# Patient Record
Sex: FEMALE | Race: BLACK OR AFRICAN AMERICAN | NOT HISPANIC OR LATINO | Employment: OTHER | URBAN - METROPOLITAN AREA
[De-identification: names, ages, dates, MRNs, and addresses within clinical notes are randomized per-mention and may not be internally consistent; named-entity substitution may affect disease eponyms.]

---

## 2023-01-28 ENCOUNTER — HOSPITAL ENCOUNTER (INPATIENT)
Facility: HOSPITAL | Age: 88
LOS: 2 days | Discharge: HOME HEALTH | DRG: 066 | End: 2023-01-31
Attending: EMERGENCY MEDICINE | Admitting: INTERNAL MEDICINE
Payer: MEDICARE

## 2023-01-28 DIAGNOSIS — R55 NEAR SYNCOPE: ICD-10-CM

## 2023-01-28 DIAGNOSIS — I63.9 STROKE: ICD-10-CM

## 2023-01-28 DIAGNOSIS — T16.1XXA FOREIGN BODY OF RIGHT EAR, INITIAL ENCOUNTER: ICD-10-CM

## 2023-01-28 DIAGNOSIS — I16.0 HYPERTENSIVE URGENCY: ICD-10-CM

## 2023-01-28 DIAGNOSIS — R42 DIZZINESS: Primary | ICD-10-CM

## 2023-01-28 PROBLEM — I10 UNCONTROLLED HYPERTENSION: Status: ACTIVE | Noted: 2023-01-28

## 2023-01-28 PROBLEM — N18.30 CHRONIC KIDNEY DISEASE, STAGE 3: Status: ACTIVE | Noted: 2023-01-28

## 2023-01-28 LAB
ALBUMIN SERPL BCP-MCNC: 3.9 G/DL (ref 3.5–5.2)
ALP SERPL-CCNC: 78 U/L (ref 55–135)
ALT SERPL W/O P-5'-P-CCNC: 11 U/L (ref 10–44)
ANION GAP SERPL CALC-SCNC: 9 MMOL/L (ref 8–16)
AST SERPL-CCNC: 23 U/L (ref 10–40)
BASOPHILS # BLD AUTO: 0.02 K/UL (ref 0–0.2)
BASOPHILS NFR BLD: 0.5 % (ref 0–1.9)
BILIRUB SERPL-MCNC: 0.8 MG/DL (ref 0.1–1)
BUN SERPL-MCNC: 17 MG/DL (ref 8–23)
CALCIUM SERPL-MCNC: 9.5 MG/DL (ref 8.7–10.5)
CHLORIDE SERPL-SCNC: 102 MMOL/L (ref 95–110)
CO2 SERPL-SCNC: 24 MMOL/L (ref 23–29)
CREAT SERPL-MCNC: 1.1 MG/DL (ref 0.5–1.4)
DIFFERENTIAL METHOD: ABNORMAL
EOSINOPHIL # BLD AUTO: 0.2 K/UL (ref 0–0.5)
EOSINOPHIL NFR BLD: 4 % (ref 0–8)
ERYTHROCYTE [DISTWIDTH] IN BLOOD BY AUTOMATED COUNT: 15.9 % (ref 11.5–14.5)
EST. GFR  (NO RACE VARIABLE): 48.6 ML/MIN/1.73 M^2
GLUCOSE SERPL-MCNC: 88 MG/DL (ref 70–110)
HCT VFR BLD AUTO: 41.4 % (ref 37–48.5)
HGB BLD-MCNC: 13.2 G/DL (ref 12–16)
IMM GRANULOCYTES # BLD AUTO: 0.01 K/UL (ref 0–0.04)
IMM GRANULOCYTES NFR BLD AUTO: 0.2 % (ref 0–0.5)
LYMPHOCYTES # BLD AUTO: 1 K/UL (ref 1–4.8)
LYMPHOCYTES NFR BLD: 23.4 % (ref 18–48)
MAGNESIUM SERPL-MCNC: 1.9 MG/DL (ref 1.6–2.6)
MCH RBC QN AUTO: 27.4 PG (ref 27–31)
MCHC RBC AUTO-ENTMCNC: 31.9 G/DL (ref 32–36)
MCV RBC AUTO: 86 FL (ref 82–98)
MONOCYTES # BLD AUTO: 0.5 K/UL (ref 0.3–1)
MONOCYTES NFR BLD: 11.1 % (ref 4–15)
NEUTROPHILS # BLD AUTO: 2.6 K/UL (ref 1.8–7.7)
NEUTROPHILS NFR BLD: 60.8 % (ref 38–73)
NRBC BLD-RTO: 0 /100 WBC
PLATELET # BLD AUTO: 243 K/UL (ref 150–450)
PMV BLD AUTO: 10.8 FL (ref 9.2–12.9)
POTASSIUM SERPL-SCNC: 4.2 MMOL/L (ref 3.5–5.1)
PROT SERPL-MCNC: 7.4 G/DL (ref 6–8.4)
RBC # BLD AUTO: 4.82 M/UL (ref 4–5.4)
SODIUM SERPL-SCNC: 135 MMOL/L (ref 136–145)
TROPONIN I SERPL HS-MCNC: 8.4 PG/ML (ref 0–14.9)
WBC # BLD AUTO: 4.23 K/UL (ref 3.9–12.7)

## 2023-01-28 PROCEDURE — 96375 TX/PRO/DX INJ NEW DRUG ADDON: CPT

## 2023-01-28 PROCEDURE — 81001 URINALYSIS AUTO W/SCOPE: CPT | Performed by: NURSE PRACTITIONER

## 2023-01-28 PROCEDURE — 63600175 PHARM REV CODE 636 W HCPCS: Performed by: EMERGENCY MEDICINE

## 2023-01-28 PROCEDURE — G0378 HOSPITAL OBSERVATION PER HR: HCPCS

## 2023-01-28 PROCEDURE — 25000003 PHARM REV CODE 250: Performed by: EMERGENCY MEDICINE

## 2023-01-28 PROCEDURE — 25000003 PHARM REV CODE 250: Performed by: NURSE PRACTITIONER

## 2023-01-28 PROCEDURE — 85025 COMPLETE CBC W/AUTO DIFF WBC: CPT | Performed by: EMERGENCY MEDICINE

## 2023-01-28 PROCEDURE — 93010 EKG 12-LEAD: ICD-10-PCS | Mod: ,,, | Performed by: INTERNAL MEDICINE

## 2023-01-28 PROCEDURE — 69200 CLEAR OUTER EAR CANAL: CPT | Mod: RT

## 2023-01-28 PROCEDURE — 93005 ELECTROCARDIOGRAM TRACING: CPT | Performed by: INTERNAL MEDICINE

## 2023-01-28 PROCEDURE — 99285 EMERGENCY DEPT VISIT HI MDM: CPT | Mod: 25

## 2023-01-28 PROCEDURE — 84484 ASSAY OF TROPONIN QUANT: CPT | Performed by: EMERGENCY MEDICINE

## 2023-01-28 PROCEDURE — 96376 TX/PRO/DX INJ SAME DRUG ADON: CPT

## 2023-01-28 PROCEDURE — 80053 COMPREHEN METABOLIC PANEL: CPT | Performed by: EMERGENCY MEDICINE

## 2023-01-28 PROCEDURE — 83735 ASSAY OF MAGNESIUM: CPT | Performed by: EMERGENCY MEDICINE

## 2023-01-28 PROCEDURE — 93010 ELECTROCARDIOGRAM REPORT: CPT | Mod: ,,, | Performed by: INTERNAL MEDICINE

## 2023-01-28 PROCEDURE — 96374 THER/PROPH/DIAG INJ IV PUSH: CPT

## 2023-01-28 RX ORDER — MECLIZINE HCL 12.5 MG 12.5 MG/1
25 TABLET ORAL
Status: COMPLETED | OUTPATIENT
Start: 2023-01-28 | End: 2023-01-28

## 2023-01-28 RX ORDER — LABETALOL HYDROCHLORIDE 5 MG/ML
10 INJECTION, SOLUTION INTRAVENOUS
Status: DISCONTINUED | OUTPATIENT
Start: 2023-01-28 | End: 2023-01-29

## 2023-01-28 RX ORDER — LABETALOL HYDROCHLORIDE 5 MG/ML
10 INJECTION, SOLUTION INTRAVENOUS
Status: COMPLETED | OUTPATIENT
Start: 2023-01-28 | End: 2023-01-28

## 2023-01-28 RX ORDER — ASPIRIN 325 MG
325 TABLET ORAL ONCE
Status: COMPLETED | OUTPATIENT
Start: 2023-01-28 | End: 2023-01-28

## 2023-01-28 RX ORDER — NAPROXEN SODIUM 220 MG/1
81 TABLET, FILM COATED ORAL DAILY
Status: DISCONTINUED | OUTPATIENT
Start: 2023-01-29 | End: 2023-01-31 | Stop reason: HOSPADM

## 2023-01-28 RX ORDER — ONDANSETRON 2 MG/ML
4 INJECTION INTRAMUSCULAR; INTRAVENOUS
Status: COMPLETED | OUTPATIENT
Start: 2023-01-28 | End: 2023-01-28

## 2023-01-28 RX ADMIN — LABETALOL HYDROCHLORIDE 10 MG: 5 INJECTION INTRAVENOUS at 10:01

## 2023-01-28 RX ADMIN — ASPIRIN 325 MG ORAL TABLET 325 MG: 325 PILL ORAL at 10:01

## 2023-01-28 RX ADMIN — LABETALOL HYDROCHLORIDE 10 MG: 5 INJECTION INTRAVENOUS at 09:01

## 2023-01-28 RX ADMIN — MECLIZINE HYDROCHLORIDE 25 MG: 12.5 TABLET ORAL at 09:01

## 2023-01-28 RX ADMIN — ONDANSETRON 4 MG: 2 INJECTION INTRAMUSCULAR; INTRAVENOUS at 09:01

## 2023-01-28 NOTE — FIRST PROVIDER EVALUATION
Medical screening examination initiated.  I have conducted a focused provider triage encounter, findings are as follows:    Brief history of present illness:  Presents with complaint of dizziness.  Patient reports she is not on blood pressure medication.  Her blood pressure here is 200/125.  Patient reports headache and blurred vision.    There were no vitals filed for this visit.    Pertinent physical exam:  Patient is alert.  She is oriented.  Heart rate regular.  Brief workup plan:  Cardiac workup    Preliminary workup initiated; this workup will be continued and followed by the physician or advanced practice provider that is assigned to the patient when roomed.

## 2023-01-29 ENCOUNTER — CLINICAL SUPPORT (OUTPATIENT)
Dept: CARDIOLOGY | Facility: HOSPITAL | Age: 88
DRG: 066 | End: 2023-01-29
Attending: EMERGENCY MEDICINE
Payer: MEDICARE

## 2023-01-29 VITALS — BODY MASS INDEX: 30.73 KG/M2 | HEIGHT: 62 IN | WEIGHT: 167 LBS

## 2023-01-29 PROBLEM — I63.9 CVA (CEREBRAL VASCULAR ACCIDENT): Status: ACTIVE | Noted: 2023-01-29

## 2023-01-29 LAB
ALBUMIN SERPL BCP-MCNC: 3.7 G/DL (ref 3.5–5.2)
ALP SERPL-CCNC: 71 U/L (ref 55–135)
ALT SERPL W/O P-5'-P-CCNC: 11 U/L (ref 10–44)
ANION GAP SERPL CALC-SCNC: 8 MMOL/L (ref 8–16)
AORTIC ROOT ANNULUS: 2.87 CM
AORTIC VALVE CUSP SEPERATION: 1.87 CM
APTT BLDCRRT: 24.4 SEC (ref 21–32)
AST SERPL-CCNC: 20 U/L (ref 10–40)
AV INDEX (PROSTH): 0.78
AV MEAN GRADIENT: 5 MMHG
AV PEAK GRADIENT: 8 MMHG
AV REGURGITATION PRESSURE HALF TIME: 391.66 MS
AV VALVE AREA: 2.46 CM2
AV VELOCITY RATIO: 0.81
BACTERIA #/AREA URNS HPF: NEGATIVE /HPF
BASOPHILS # BLD AUTO: 0.02 K/UL (ref 0–0.2)
BASOPHILS NFR BLD: 0.6 % (ref 0–1.9)
BILIRUB SERPL-MCNC: 0.8 MG/DL (ref 0.1–1)
BILIRUB UR QL STRIP: NEGATIVE
BSA FOR ECHO PROCEDURE: 1.82 M2
BUN SERPL-MCNC: 14 MG/DL (ref 8–23)
CALCIUM SERPL-MCNC: 9.3 MG/DL (ref 8.7–10.5)
CHLORIDE SERPL-SCNC: 102 MMOL/L (ref 95–110)
CHOLEST SERPL-MCNC: 154 MG/DL (ref 120–199)
CHOLEST/HDLC SERPL: 3.3 {RATIO} (ref 2–5)
CK MB SERPL-MCNC: 2.9 NG/ML (ref 0.1–6.5)
CLARITY UR: CLEAR
CO2 SERPL-SCNC: 25 MMOL/L (ref 23–29)
COLOR UR: YELLOW
CREAT SERPL-MCNC: 1 MG/DL (ref 0.5–1.4)
CV ECHO LV RWT: 0.83 CM
DIFFERENTIAL METHOD: ABNORMAL
DOP CALC AO PEAK VEL: 1.37 M/S
DOP CALC AO VTI: 29.1 CM
DOP CALC LVOT AREA: 3.1 CM2
DOP CALC LVOT DIAMETER: 2 CM
DOP CALC LVOT PEAK VEL: 1.11 M/S
DOP CALC LVOT STROKE VOLUME: 71.59 CM3
DOP CALCLVOT PEAK VEL VTI: 22.8 CM
E WAVE DECELERATION TIME: 329.49 MSEC
E/A RATIO: 0.77
E/E' RATIO: 15.2 M/S
ECHO LV POSTERIOR WALL: 1.22 CM (ref 0.6–1.1)
EJECTION FRACTION: 78 %
EOSINOPHIL # BLD AUTO: 0.1 K/UL (ref 0–0.5)
EOSINOPHIL NFR BLD: 4.1 % (ref 0–8)
ERYTHROCYTE [DISTWIDTH] IN BLOOD BY AUTOMATED COUNT: 15.7 % (ref 11.5–14.5)
EST. GFR  (NO RACE VARIABLE): 54.5 ML/MIN/1.73 M^2
ESTIMATED AVG GLUCOSE: 126 MG/DL (ref 68–131)
FRACTIONAL SHORTENING: 44 % (ref 28–44)
GLUCOSE SERPL-MCNC: 93 MG/DL (ref 70–110)
GLUCOSE UR QL STRIP: NEGATIVE
HBA1C MFR BLD: 6 % (ref 4.5–6.2)
HCT VFR BLD AUTO: 41.3 % (ref 37–48.5)
HDLC SERPL-MCNC: 47 MG/DL (ref 40–75)
HDLC SERPL: 30.5 % (ref 20–50)
HGB BLD-MCNC: 13.2 G/DL (ref 12–16)
HGB UR QL STRIP: NEGATIVE
HYALINE CASTS #/AREA URNS LPF: 3 /LPF
IMM GRANULOCYTES # BLD AUTO: 0.01 K/UL (ref 0–0.04)
IMM GRANULOCYTES NFR BLD AUTO: 0.3 % (ref 0–0.5)
INR PPP: 1 (ref 0.8–1.2)
INTERVENTRICULAR SEPTUM: 2.05 CM (ref 0.6–1.1)
KETONES UR QL STRIP: NEGATIVE
LDLC SERPL CALC-MCNC: 100 MG/DL (ref 63–159)
LEFT INTERNAL DIMENSION IN SYSTOLE: 1.65 CM (ref 2.1–4)
LEFT VENTRICLE DIASTOLIC VOLUME INDEX: 18.71 ML/M2
LEFT VENTRICLE DIASTOLIC VOLUME: 33.12 ML
LEFT VENTRICLE MASS INDEX: 101 G/M2
LEFT VENTRICLE SYSTOLIC VOLUME INDEX: 4.4 ML/M2
LEFT VENTRICLE SYSTOLIC VOLUME: 7.7 ML
LEFT VENTRICULAR INTERNAL DIMENSION IN DIASTOLE: 2.93 CM (ref 3.5–6)
LEFT VENTRICULAR MASS: 177.96 G
LEUKOCYTE ESTERASE UR QL STRIP: ABNORMAL
LV LATERAL E/E' RATIO: 12.67 M/S
LV SEPTAL E/E' RATIO: 19 M/S
LVOT MG: 2.57 MMHG
LVOT MV: 0.74 CM/S
LYMPHOCYTES # BLD AUTO: 1.3 K/UL (ref 1–4.8)
LYMPHOCYTES NFR BLD: 36.9 % (ref 18–48)
MAGNESIUM SERPL-MCNC: 1.9 MG/DL (ref 1.6–2.6)
MCH RBC QN AUTO: 27 PG (ref 27–31)
MCHC RBC AUTO-ENTMCNC: 32 G/DL (ref 32–36)
MCV RBC AUTO: 85 FL (ref 82–98)
MICROSCOPIC COMMENT: ABNORMAL
MONOCYTES # BLD AUTO: 0.5 K/UL (ref 0.3–1)
MONOCYTES NFR BLD: 14.2 % (ref 4–15)
MV PEAK A VEL: 0.99 M/S
MV PEAK E VEL: 0.76 M/S
MV STENOSIS PRESSURE HALF TIME: 95.55 MS
MV VALVE AREA P 1/2 METHOD: 2.3 CM2
NEUTROPHILS # BLD AUTO: 1.5 K/UL (ref 1.8–7.7)
NEUTROPHILS NFR BLD: 43.9 % (ref 38–73)
NITRITE UR QL STRIP: NEGATIVE
NONHDLC SERPL-MCNC: 107 MG/DL
NRBC BLD-RTO: 0 /100 WBC
PH UR STRIP: 6 [PH] (ref 5–8)
PHOSPHATE SERPL-MCNC: 2.5 MG/DL (ref 2.7–4.5)
PISA TR MAX VEL: 2.95 M/S
PLATELET # BLD AUTO: 227 K/UL (ref 150–450)
PMV BLD AUTO: 9.9 FL (ref 9.2–12.9)
POTASSIUM SERPL-SCNC: 3.5 MMOL/L (ref 3.5–5.1)
PROT SERPL-MCNC: 7 G/DL (ref 6–8.4)
PROT UR QL STRIP: NEGATIVE
PROTHROMBIN TIME: 10.5 SEC (ref 9–12.5)
PV MV: 0.79 M/S
PV PEAK VELOCITY: 1.15 CM/S
RA PRESSURE: 3 MMHG
RBC # BLD AUTO: 4.88 M/UL (ref 4–5.4)
RBC #/AREA URNS HPF: 3 /HPF (ref 0–4)
RV TISSUE DOPPLER FREE WALL SYSTOLIC VELOCITY 1 (APICAL 4 CHAMBER VIEW): 0.01 CM/S
SINUS: 2.49 CM
SODIUM SERPL-SCNC: 135 MMOL/L (ref 136–145)
SP GR UR STRIP: 1.01 (ref 1–1.03)
SQUAMOUS #/AREA URNS HPF: 3 /HPF
STJ: 2.88 CM
TDI LATERAL: 0.06 M/S
TDI SEPTAL: 0.04 M/S
TDI: 0.05 M/S
TR MAX PG: 35 MMHG
TRICUSPID ANNULAR PLANE SYSTOLIC EXCURSION: 1.67 CM
TRIGL SERPL-MCNC: 35 MG/DL (ref 30–150)
TROPONIN I SERPL HS-MCNC: 10.9 PG/ML (ref 0–14.9)
TSH SERPL DL<=0.005 MIU/L-ACNC: 4.07 UIU/ML (ref 0.34–5.6)
TV REST PULMONARY ARTERY PRESSURE: 38 MMHG
URN SPEC COLLECT METH UR: ABNORMAL
UROBILINOGEN UR STRIP-ACNC: NEGATIVE EU/DL
WBC # BLD AUTO: 3.44 K/UL (ref 3.9–12.7)
WBC #/AREA URNS HPF: 9 /HPF (ref 0–5)

## 2023-01-29 PROCEDURE — 25500020 PHARM REV CODE 255: Performed by: INTERNAL MEDICINE

## 2023-01-29 PROCEDURE — 97161 PT EVAL LOW COMPLEX 20 MIN: CPT

## 2023-01-29 PROCEDURE — 85610 PROTHROMBIN TIME: CPT | Performed by: NURSE PRACTITIONER

## 2023-01-29 PROCEDURE — 97535 SELF CARE MNGMENT TRAINING: CPT

## 2023-01-29 PROCEDURE — 92610 EVALUATE SWALLOWING FUNCTION: CPT

## 2023-01-29 PROCEDURE — 97165 OT EVAL LOW COMPLEX 30 MIN: CPT

## 2023-01-29 PROCEDURE — 83735 ASSAY OF MAGNESIUM: CPT | Performed by: NURSE PRACTITIONER

## 2023-01-29 PROCEDURE — 92523 SPEECH SOUND LANG COMPREHEN: CPT

## 2023-01-29 PROCEDURE — 84484 ASSAY OF TROPONIN QUANT: CPT | Performed by: NURSE PRACTITIONER

## 2023-01-29 PROCEDURE — 93306 ECHO (CUPID ONLY): ICD-10-PCS | Mod: 26,,, | Performed by: INTERNAL MEDICINE

## 2023-01-29 PROCEDURE — 93306 TTE W/DOPPLER COMPLETE: CPT

## 2023-01-29 PROCEDURE — 25000003 PHARM REV CODE 250: Performed by: STUDENT IN AN ORGANIZED HEALTH CARE EDUCATION/TRAINING PROGRAM

## 2023-01-29 PROCEDURE — 85025 COMPLETE CBC W/AUTO DIFF WBC: CPT | Performed by: NURSE PRACTITIONER

## 2023-01-29 PROCEDURE — 25000003 PHARM REV CODE 250: Performed by: INTERNAL MEDICINE

## 2023-01-29 PROCEDURE — 25000003 PHARM REV CODE 250: Performed by: NURSE PRACTITIONER

## 2023-01-29 PROCEDURE — 80053 COMPREHEN METABOLIC PANEL: CPT | Performed by: NURSE PRACTITIONER

## 2023-01-29 PROCEDURE — 63600175 PHARM REV CODE 636 W HCPCS

## 2023-01-29 PROCEDURE — 36415 COLL VENOUS BLD VENIPUNCTURE: CPT | Performed by: NURSE PRACTITIONER

## 2023-01-29 PROCEDURE — 96375 TX/PRO/DX INJ NEW DRUG ADDON: CPT

## 2023-01-29 PROCEDURE — 63600175 PHARM REV CODE 636 W HCPCS: Performed by: STUDENT IN AN ORGANIZED HEALTH CARE EDUCATION/TRAINING PROGRAM

## 2023-01-29 PROCEDURE — 63600175 PHARM REV CODE 636 W HCPCS: Performed by: NURSE PRACTITIONER

## 2023-01-29 PROCEDURE — 82553 CREATINE MB FRACTION: CPT | Performed by: NURSE PRACTITIONER

## 2023-01-29 PROCEDURE — 84443 ASSAY THYROID STIM HORMONE: CPT | Performed by: NURSE PRACTITIONER

## 2023-01-29 PROCEDURE — 21400001 HC TELEMETRY ROOM

## 2023-01-29 PROCEDURE — 93306 TTE W/DOPPLER COMPLETE: CPT | Mod: 26,,, | Performed by: INTERNAL MEDICINE

## 2023-01-29 PROCEDURE — 83036 HEMOGLOBIN GLYCOSYLATED A1C: CPT | Performed by: NURSE PRACTITIONER

## 2023-01-29 PROCEDURE — 96376 TX/PRO/DX INJ SAME DRUG ADON: CPT

## 2023-01-29 PROCEDURE — 85730 THROMBOPLASTIN TIME PARTIAL: CPT | Performed by: NURSE PRACTITIONER

## 2023-01-29 PROCEDURE — 80061 LIPID PANEL: CPT | Performed by: NURSE PRACTITIONER

## 2023-01-29 PROCEDURE — 84100 ASSAY OF PHOSPHORUS: CPT | Performed by: NURSE PRACTITIONER

## 2023-01-29 RX ORDER — CLOPIDOGREL BISULFATE 75 MG/1
75 TABLET ORAL DAILY
Status: DISCONTINUED | OUTPATIENT
Start: 2023-01-29 | End: 2023-01-31 | Stop reason: HOSPADM

## 2023-01-29 RX ORDER — LABETALOL HYDROCHLORIDE 5 MG/ML
10 INJECTION, SOLUTION INTRAVENOUS EVERY 4 HOURS PRN
Status: DISCONTINUED | OUTPATIENT
Start: 2023-01-29 | End: 2023-01-31 | Stop reason: HOSPADM

## 2023-01-29 RX ORDER — AMLODIPINE BESYLATE 5 MG/1
5 TABLET ORAL ONCE
Status: COMPLETED | OUTPATIENT
Start: 2023-01-29 | End: 2023-01-29

## 2023-01-29 RX ORDER — LANOLIN ALCOHOL/MO/W.PET/CERES
800 CREAM (GRAM) TOPICAL
Status: DISCONTINUED | OUTPATIENT
Start: 2023-01-29 | End: 2023-01-31 | Stop reason: HOSPADM

## 2023-01-29 RX ORDER — SODIUM,POTASSIUM PHOSPHATES 280-250MG
2 POWDER IN PACKET (EA) ORAL
Status: DISCONTINUED | OUTPATIENT
Start: 2023-01-29 | End: 2023-01-31 | Stop reason: HOSPADM

## 2023-01-29 RX ORDER — SODIUM CHLORIDE 0.9 % (FLUSH) 0.9 %
10 SYRINGE (ML) INJECTION
Status: DISCONTINUED | OUTPATIENT
Start: 2023-01-29 | End: 2023-01-31 | Stop reason: HOSPADM

## 2023-01-29 RX ORDER — AMLODIPINE BESYLATE 5 MG/1
5 TABLET ORAL DAILY
Status: DISCONTINUED | OUTPATIENT
Start: 2023-01-29 | End: 2023-01-29

## 2023-01-29 RX ORDER — ONDANSETRON 2 MG/ML
4 INJECTION INTRAMUSCULAR; INTRAVENOUS EVERY 6 HOURS PRN
Status: DISCONTINUED | OUTPATIENT
Start: 2023-01-29 | End: 2023-01-31 | Stop reason: HOSPADM

## 2023-01-29 RX ORDER — AMLODIPINE BESYLATE 5 MG/1
10 TABLET ORAL DAILY
Status: DISCONTINUED | OUTPATIENT
Start: 2023-01-30 | End: 2023-01-31 | Stop reason: HOSPADM

## 2023-01-29 RX ORDER — POLYETHYLENE GLYCOL 3350 17 G/17G
17 POWDER, FOR SOLUTION ORAL 2 TIMES DAILY PRN
Status: DISCONTINUED | OUTPATIENT
Start: 2023-01-29 | End: 2023-01-31 | Stop reason: HOSPADM

## 2023-01-29 RX ORDER — HYDRALAZINE HYDROCHLORIDE 20 MG/ML
INJECTION INTRAMUSCULAR; INTRAVENOUS
Status: COMPLETED
Start: 2023-01-29 | End: 2023-01-29

## 2023-01-29 RX ORDER — ATORVASTATIN CALCIUM 40 MG/1
40 TABLET, FILM COATED ORAL DAILY
Status: DISCONTINUED | OUTPATIENT
Start: 2023-01-29 | End: 2023-01-31 | Stop reason: HOSPADM

## 2023-01-29 RX ORDER — ACETAMINOPHEN 325 MG/1
650 TABLET ORAL EVERY 6 HOURS PRN
Status: DISCONTINUED | OUTPATIENT
Start: 2023-01-29 | End: 2023-01-31 | Stop reason: HOSPADM

## 2023-01-29 RX ORDER — HYDRALAZINE HYDROCHLORIDE 20 MG/ML
10 INJECTION INTRAMUSCULAR; INTRAVENOUS EVERY 4 HOURS PRN
Status: DISCONTINUED | OUTPATIENT
Start: 2023-01-29 | End: 2023-01-31 | Stop reason: HOSPADM

## 2023-01-29 RX ORDER — ENOXAPARIN SODIUM 100 MG/ML
40 INJECTION SUBCUTANEOUS EVERY 24 HOURS
Status: DISCONTINUED | OUTPATIENT
Start: 2023-01-29 | End: 2023-01-31 | Stop reason: HOSPADM

## 2023-01-29 RX ADMIN — ATORVASTATIN CALCIUM 40 MG: 40 TABLET, FILM COATED ORAL at 08:01

## 2023-01-29 RX ADMIN — HYDRALAZINE HYDROCHLORIDE 10 MG: 20 INJECTION INTRAMUSCULAR; INTRAVENOUS at 01:01

## 2023-01-29 RX ADMIN — AMLODIPINE BESYLATE 5 MG: 5 TABLET ORAL at 06:01

## 2023-01-29 RX ADMIN — IOHEXOL 100 ML: 350 INJECTION, SOLUTION INTRAVENOUS at 12:01

## 2023-01-29 RX ADMIN — CLOPIDOGREL BISULFATE 75 MG: 75 TABLET, FILM COATED ORAL at 01:01

## 2023-01-29 RX ADMIN — ENOXAPARIN SODIUM 40 MG: 100 INJECTION SUBCUTANEOUS at 06:01

## 2023-01-29 RX ADMIN — HYDRALAZINE HYDROCHLORIDE 10 MG: 20 INJECTION INTRAMUSCULAR; INTRAVENOUS at 05:01

## 2023-01-29 RX ADMIN — ASPIRIN 81 MG CHEWABLE TABLET 81 MG: 81 TABLET CHEWABLE at 08:01

## 2023-01-29 RX ADMIN — ATORVASTATIN CALCIUM 40 MG: 40 TABLET, FILM COATED ORAL at 04:01

## 2023-01-29 RX ADMIN — LABETALOL HYDROCHLORIDE 10 MG: 5 INJECTION INTRAVENOUS at 12:01

## 2023-01-29 RX ADMIN — AMLODIPINE BESYLATE 5 MG: 5 TABLET ORAL at 08:01

## 2023-01-29 NOTE — PLAN OF CARE
[facility]  Initial Discharge Assessment       Primary Care Provider: Primary Doctor No    Admission Diagnosis: Near syncope [R55]    Admission Date: 1/28/2023  Expected Discharge Date:     Discharge Barriers Identified: None    Payor: [employer] / Plan: [employer] MEDICARE HMO / Product Type: Medicare Advantage /     Extended Emergency Contact Information  Primary Emergency Contact: Sincere Fine  Mobile Phone: [phone]  Relation: Son  Preferred language: English   needed? No    Discharge Plan A: Home  Discharge Plan B: Home    No Pharmacies Listed    Initial Assessment (most recent)       Adult Discharge Assessment - 01/29/23 1543          Discharge Assessment    Assessment Type Discharge Planning Assessment     Confirmed/corrected address, phone number and insurance Yes     Confirmed Demographics Correct on Facesheet     Source of Information patient;health record     Reason For Admission Dizziness and giddiness     People in Home alone   Visiting son    Facility Arrived From: home     Do you expect to return to your current living situation? Yes     Do you have help at home or someone to help you manage your care at home? Yes     Who are your caregiver(s) and their phone number(s)? BabatundeSincere (Son)   [phone] (Mobile     Prior to hospitilization cognitive status: Alert/Oriented     Current cognitive status: Alert/Oriented     Equipment Currently Used at Home none     Readmission within 30 days? No     Patient currently being followed by outpatient case management? No     Do you currently have service(s) that help you manage your care at home? No     Do you take prescription medications? Yes     Do you have prescription coverage? Yes     Coverage [employer]     Do you have any problems affording any of your prescribed medications? No     Is the patient taking medications as prescribed? yes     Who is going to help you get home at discharge? Sincere Fine (Son)   [phone] (Mobile      How do you get to doctors appointments? car, drives self;family or friend will provide     Are you on dialysis? No     Do you take coumadin? No     Discharge Plan A Home     Discharge Plan B Home     DME Needed Upon Discharge  none     Discharge Plan discussed with: Patient     Discharge Barriers Identified None

## 2023-01-29 NOTE — SUBJECTIVE & OBJECTIVE
Past Medical History:   Diagnosis Date    Allergies     Essential (primary) hypertension        Past Surgical History:   Procedure Laterality Date    FOOT SURGERY         Review of patient's allergies indicates:  No Known Allergies    No current facility-administered medications on file prior to encounter.     No current outpatient medications on file prior to encounter.     Family History       Problem Relation (Age of Onset)    No Known Problems Mother    Stroke Maternal Aunt          Tobacco Use    Smoking status: Never    Smokeless tobacco: Never   Substance and Sexual Activity    Alcohol use: Never    Drug use: Never    Sexual activity: Not on file     Review of Systems   All other systems reviewed and are negative.  Objective:     Vital Signs (Most Recent):  Temp: 98.6 °F (37 °C) (01/28/23 1700)  Pulse: 72 (01/28/23 2221)  Resp: (!) 22 (01/28/23 2221)  BP: (!) 168/94 (01/28/23 2221)  SpO2: 100 % (01/28/23 2221)   Vital Signs (24h Range):  Temp:  [98.6 °F (37 °C)] 98.6 °F (37 °C)  Pulse:  [66-91] 72  Resp:  [10-22] 22  SpO2:  [94 %-100 %] 100 %  BP: (168-227)/() 168/94     Weight: 56.7 kg (125 lb)  Body mass index is 22.86 kg/m².    Physical Exam  Vitals and nursing note reviewed.   Constitutional:       General: She is not in acute distress.     Appearance: Normal appearance. She is not ill-appearing.      Comments: Elderly female, lying in bed awake alert, she is in no acute distress in his a fairly good historian.  She is accompanied by her son.   HENT:      Head: Normocephalic.      Right Ear: External ear normal.      Left Ear: External ear normal.      Ears:      Comments: A small piece of cotton was removed from her right ear by ED physician.     Nose: Nose normal.      Mouth/Throat:      Mouth: Mucous membranes are moist.      Pharynx: Oropharynx is clear.   Eyes:      Conjunctiva/sclera: Conjunctivae normal.   Cardiovascular:      Rate and Rhythm: Normal rate and regular rhythm.      Pulses:  Normal pulses.      Heart sounds: Normal heart sounds. No murmur heard.  Pulmonary:      Effort: Pulmonary effort is normal.      Breath sounds: Normal breath sounds. No wheezing or rhonchi.   Abdominal:      General: Bowel sounds are normal.      Palpations: Abdomen is soft.      Tenderness: There is no abdominal tenderness.   Musculoskeletal:         General: Normal range of motion.      Cervical back: Normal range of motion.      Comments: Moves all extremities with good equal strength, no drift.   Skin:     General: Skin is warm and dry.      Capillary Refill: Capillary refill takes less than 2 seconds.   Neurological:      General: No focal deficit present.      Mental Status: She is alert and oriented to person, place, and time.      Sensory: No sensory deficit.      Motor: No weakness.      Comments: Mildly impaired coordination   Psychiatric:         Mood and Affect: Mood normal.         Behavior: Behavior normal.         Thought Content: Thought content normal.         Judgment: Judgment normal.           Significant Labs: All pertinent labs within the past 24 hours have been reviewed.    BMP:   Recent Labs   Lab 01/28/23 2027   GLU 88   *   K 4.2      CO2 24   BUN 17   CREATININE 1.1   CALCIUM 9.5   MG 1.9     CBC:   Recent Labs   Lab 01/28/23 2027   WBC 4.23   HGB 13.2   HCT 41.4        CMP:   Recent Labs   Lab 01/28/23 2027   *   K 4.2      CO2 24   GLU 88   BUN 17   CREATININE 1.1   CALCIUM 9.5   PROT 7.4   ALBUMIN 3.9   BILITOT 0.8   ALKPHOS 78   AST 23   ALT 11   ANIONGAP 9     Magnesium:   Recent Labs   Lab 01/28/23 2027   MG 1.9       Troponin:   Recent Labs   Lab 01/28/23 2027   TROPONINIHS 8.4         Significant Imaging: I have reviewed all pertinent imaging results/findings within the past 24 hours.

## 2023-01-29 NOTE — PT/OT/SLP EVAL
Occupational Therapy   Evaluation, tx    Name: Heidi Fine  MRN: [id number]  Admitting Diagnosis: Dizziness and giddiness  Recent Surgery: * No surgery found *    The primary encounter diagnosis was Dizziness. Diagnoses of Hypertensive urgency, Foreign body of right ear, initial encounter, Near syncope, and Stroke were also pertinent to this visit.    Recommendations:     Discharge Recommendations: home health OT  Discharge Equipment Recommendations:  bath bench  Barriers to discharge:  None    Assessment:     Heidi Fine is a 87 y.o. female with a medical diagnosis of Dizziness and giddiness. 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 Pt. presents with Performance deficits affecting function: impaired functional mobility, gait instability, impaired balance, impaired self care skills, decreased lower extremity function, decreased safety awareness, decreased coordination.    Pt lacks insight into deficits and lacks good safety awareness. She declines and denies need for assistive device for ambulation and bathing. Pt is a fall risk, ambulated CGA with significant instability vs ataxia, trunk flexed forward near 90 degrees when ambulating; she and son reports this is her baseline. Pt is overall SBA with ADLS and CGA for transfers and ambulation.  OT recommending HHOT and a TTB for safety.  Continue with OT POC.     Rehab Prognosis: Good and Fair; patient would benefit from acute skilled OT services to address these deficits and reach maximum level of function.       Plan:     Patient to be seen 5 x/week to address the above listed problems via self-care/home management, therapeutic activities, therapeutic exercises  Plan of Care Expires: 02/26/23  Plan of Care Reviewed with: patient, son    Subjective     Chief Complaint: none  Patient/Family Comments/goals: I don't need anything to walk with, I am walking fine.    Occupational  Profile:  Living Environment: pt lives with son in [facility] with no steps; t/s combo.  Previous level of function: Indep ADLS, ambulated Indep without a device; does not drive; In dep meal prep and household chores.   Roles and Routines: active in above  Equipment Used at Home: none  Assistance upon Discharge: son, he works during the day in [place]    Pain/Comfort:  Pain Rating 1: 0/10  Pain Rating Post-Intervention 1: 0/10    Patients cultural, spiritual, Presybeterian conflicts given the current situation: no    Objective:     Communicated with: nurse prior to session.  Patient found HOB elevated with bed alarm, telemetry upon OT entry to room.    General Precautions: Standard, fall  Orthopedic Precautions: N/A  Braces: N/A  Respiratory Status: Room air    Occupational Performance:    Bed Mobility:    Patient completed Rolling/Turning to Left with  supervision  Patient completed Scooting/Bridging with stand by assistance  Patient completed Supine to Sit with stand by assistance  Patient completed Sit to Supine with stand by assistance    Functional Mobility/Transfers:  Patient completed Sit <> Stand Transfer with stand by assistance  with  no assistive device   Patient completed Toilet Transfer Step Transfer technique with contact guard assistance with  no AD  Functional Mobility: ambulated CGA short distance in room to bathroom, sink and back to bed, pt opened bathroom door without LOB. Pt's trunk forwardly flexed near 90 degrees, pt able to stand more erect with vc and tc, says she walks like this at home, significant instability with gait vs ataxia??     Activities of Daily Living:  Feeding:  independence .  Grooming: supervision/Stand by assistance standing at sink, questionable safety/judgement, bending to pick trash up from floor  Lower Body Dressing: stand by assistance socks, shoes seated EOB  Toileting: declined use    Cognitive/Visual Perceptual:  Cognitive/Psychosocial Skills:     -       Oriented to:  Person, Place, Time, and Situation   -       Follows Commands/attention:Follows one-step commands  -       Communication: clear/fluent  -       Memory: Poor immediate recall  -       Safety awareness/insight to disability: impaired   -       Mood/Affect/Coping skills/emotional control: Cooperative  Visual/Perceptual:      -Intact grossly; ?? Nystagmus left beating    Physical Exam:  Balance:    -       siting; good  dynamic: good  standing; good-  dynamic: fair  Postural examination/scapula alignment:    -       Rounded shoulders  -       Forward head  Sensation:    -       Intact  Motor Planning:    -       intact  Dominant hand:    -       rigth  Upper Extremity Range of Motion:     -       Right Upper Extremity: WFL  -       Left Upper Extremity: WFL  Upper Extremity Strength:    -       Right Upper Extremity: WFL  -       Left Upper Extremity: WFL   Strength:    -       Right Upper Extremity: WFL  -       Left Upper Extremity: WFL  Fine Motor Coordination:    -       Intact  Gross motor coordination:     Neurological:    -       normal tone    AMPAC 6 Click ADL:  AMPAC Total Score: 24    Treatment & Education:  Purpose of OT and POC'  DC planning with pt and son: HHOT and TTB recommended, pt not receptive to DME recs. Son not engaged, not talking.  All questions/concerns addressed within scope.  Pt instructed in RW use for safe ambulation in room for ADLS, she took 4 steps with walker with SBA/Supervision and pushed it aside statiing she did not need it.   Fall prevention/safety education provided, call don;t fall and call bell issued.       Patient left HOB elevated with all lines intact, call button in reach, bed alarm on, and nurse notified    GOALS:   Multidisciplinary Problems       Occupational Therapy Goals          Problem: Occupational Therapy    Goal Priority Disciplines Outcome Interventions   Occupational Therapy Goal     OT, PT/OT Ongoing, Progressing    Description: Goals to be met by:  2/15/2023     Patient will increase functional independence with ADLs by performing:    LE Dressing with Modified [place].  Grooming while standing at sink with Modified [place].  Toileting from toilet with Modified [place] for hygiene and clothing management.   Toilet transfer to toilet with Modified [place].                         History:     Past Medical History:   Diagnosis Date    Allergies     Essential (primary) hypertension          Past Surgical History:   Procedure Laterality Date    FOOT SURGERY         Time Tracking:     OT Date of Treatment: 01/29/23  OT Start Time: 1429  OT Stop Time: 1448  OT Total Time (min): 19 min    Billable Minutes:Evaluation 10  Self Care/Home Management 9  Total Time 19    1/29/2023

## 2023-01-29 NOTE — PT/OT/SLP EVAL
Physical Therapy Evaluation    Patient Name:  Heidi Fine   MRN:  [id number]    Recommendations:     Discharge Recommendations: home with home health, home health PT, home health OT   Discharge Equipment Recommendations: none   Barriers to discharge: None    Assessment:     Heidi Fine is a 87 y.o. female admitted with a medical diagnosis of Dizziness and giddiness.  She presents with the following impairments/functional limitations: gait instability, impaired balance.    Rehab Prognosis: Good; patient would benefit from acute skilled PT services to address these deficits and reach maximum level of function.    Recent Surgery: * No surgery found *      Plan:     During this hospitalization, patient to be seen 5 x/week to address the identified rehab impairments via gait training, therapeutic activities, therapeutic exercises, neuromuscular re-education and progress toward the following goals:    Plan of Care Expires:  03/05/23    Subjective     Chief Complaint: Pt has no complaints at time of assessment  Patient/Family Comments/goals: Go Home. Pt reports dizziness has resolved.  Pain/Comfort:  Pain Rating 1: 0/10    Patients cultural, spiritual, Christian conflicts given the current situation: no    Living Environment:  Pt lives /c son in a [facility]  Prior to admission, patients level of function was (I).  Equipment used at home: none.  DME owned (not currently used): none.  Upon discharge, patient will have assistance from Son.    Objective:     Communicated with nurse prior to session.  Patient found sitting edge of bed with peripheral IV  upon PT entry to room.    General Precautions: Standard, fall  Orthopedic Precautions:N/A   Braces: N/A  Respiratory Status: Room air    Exams:  Cognitive Exam:  Patient is oriented to Person, Place, Time, and Situation (difficulty following simple commands at times)  Gross Motor Coordination:  WFL  Postural Exam:  Patient presented with the following abnormalities:    -        Forward head  -       Posterior pelvic tilt  -       Kyphosis  Sensation:    -       Intact  RLE ROM: WFL  RLE Strength: WFL except hips 3+/5  LLE ROM: WFL  LLE Strength: WFL except hips 3+/5    Functional Mobility:  Bed Mobility:     Supine to Sit: supervision  Sit to Supine: supervision  Transfers:     Sit to Stand:  stand by assistance with no AD  Bed to Chair: stand by assistance with  no AD  using  Stand Pivot  Gait: 100ft contact guard assistance with no AD  Balance: standing unsupported dynamic: Fair-      AM-PAC 6 CLICK MOBILITY  Total Score:20       Treatment & Education:  Pt experienced 1 LOB to (L) /c gait assessment and difficulty turning/changing directions. She displays severely FF posture at hips and lumbar spine /c mild knee flex when walking. She reports no back pain. PT edu pt on slowing down and not turning so sharply    Patient left sitting edge of bed with call button in reach and son present.    GOALS:   Multidisciplinary Problems       Physical Therapy Goals          Problem: Physical Therapy    Goal Priority Disciplines Outcome Goal Variances Interventions   Physical Therapy Goal     PT, PT/OT      Description: Goals to be met by: 23     Patient will increase functional independence with mobility by performin. Sit to stand transfer with [place]  3. Bed to chair transfer with [place] using No Assistive Device  4. Gait  x 150 feet with Supervision using No Assistive Device.                          History:     Past Medical History:   Diagnosis Date    Allergies     Essential (primary) hypertension        Past Surgical History:   Procedure Laterality Date    FOOT SURGERY         Time Tracking:     PT Received On: 23  PT Start Time: 955     PT Stop Time: 1012  PT Total Time (min): 17 min     Billable Minutes: Evaluation 17      2023

## 2023-01-29 NOTE — PLAN OF CARE
Problem: Adult Inpatient Plan of Care  Goal: Plan of Care Review  Outcome: Ongoing, Progressing  Goal: Patient-Specific Goal (Individualized)  Outcome: Ongoing, Progressing  Goal: Absence of Hospital-Acquired Illness or Injury  Outcome: Ongoing, Progressing  Goal: Optimal Comfort and Wellbeing  Outcome: Ongoing, Progressing  Goal: Readiness for Transition of Care  Outcome: Ongoing, Progressing     Problem: Adjustment to Illness (Stroke, Ischemic/Transient Ischemic Attack)  Goal: Optimal Coping  Outcome: Ongoing, Progressing     Problem: Bowel Elimination Impaired (Stroke, Ischemic/Transient Ischemic Attack)  Goal: Effective Bowel Elimination  Outcome: Ongoing, Progressing     Problem: Cerebral Tissue Perfusion (Stroke, Ischemic/Transient Ischemic Attack)  Goal: Optimal Cerebral Tissue Perfusion  Outcome: Ongoing, Progressing     Problem: Cognitive Impairment (Stroke, Ischemic/Transient Ischemic Attack)  Goal: Optimal Cognitive Function  Outcome: Ongoing, Progressing     Problem: Communication Impairment (Stroke, Ischemic/Transient Ischemic Attack)  Goal: Improved Communication Skills  Outcome: Ongoing, Progressing     Problem: Functional Ability Impaired (Stroke, Ischemic/Transient Ischemic Attack)  Goal: Optimal Functional Ability  Outcome: Ongoing, Progressing     Problem: Respiratory Compromise (Stroke, Ischemic/Transient Ischemic Attack)  Goal: Effective Oxygenation and Ventilation  Outcome: Ongoing, Progressing     Problem: Sensorimotor Impairment (Stroke, Ischemic/Transient Ischemic Attack)  Goal: Improved Sensorimotor Function  Outcome: Ongoing, Progressing     Problem: Swallowing Impairment (Stroke, Ischemic/Transient Ischemic Attack)  Goal: Optimal Eating and Swallowing without Aspiration  Outcome: Ongoing, Progressing     Problem: Urinary Elimination Impaired (Stroke, Ischemic/Transient Ischemic Attack)  Goal: Effective Urinary Elimination  Outcome: Ongoing, Progressing     Problem: Fall Injury  PHYSICAL THERAPY - DAILY TREATMENT NOTE    Patient Name: Olivia Pereira        Date: 6/15/2017  : 1977   YES Patient  Verified  Visit #:     Insurance: Payor: Cristian Hoskins / Plan: Oly Contreras PPO / Product Type: PPO /      In time: 1120 Out time: 12   Total Treatment Time: 40     Medicare Time Tracking (below)   Total Timed Codes (min):  40 1:1 Treatment Time:       TREATMENT AREA =  Right foot drop [M21.371]    SUBJECTIVE  Pain Level (on 0 to 10 scale):  3-4  / 10   Medication Changes/New allergies or changes in medical history, any new surgeries or procedures? NO    If yes, update Summary List   Subjective Functional Status/Changes:  []  No changes reported     Pt went to the water park with kids this week. OBJECTIVE  Modalities Rationale:     increase muscle contraction/control to improve patient's ability to ambulate. 12 min [x] Estim, type/location: [facility], 10:20, (R) ant tib. []  att     []  unatt     []  w/US     []  w/ice    []  w/heat    min []  Mechanical Traction: type/lbs                   []  pro   []  sup   []  int   []  cont    []  before manual    []  after manual    min []  Ultrasound, settings/location:      min []  Iontophoresis w/ dexamethasone, location:                                               []  take home patch       []  in clinic    min []  Ice     []  Heat    location/position:     min []  Vasopneumatic Device, press/temp:     min []  Other:    [x] Skin assessment post-treatment (if applicable):    [x]  intact    []  redness- no adverse reaction     []redness  adverse reaction:        28 min Therapeutic Exercise:  [x]  See flow sheet   Rationale:      increase ROM, increase strength, improve coordination and improve balance to improve the patients ability to ambulate. min Patient Education:  YES  Reviewed HEP   []  Progressed/Changed HEP based on: Other Objective/Functional Measures:     Therex per flow Risk  Goal: Absence of Fall and Fall-Related Injury  Outcome: Ongoing, Progressing      sheet. Pt arrived 21' late for scheduled appointment. FOTO = 38   GROC = +4      Post Treatment Pain Level (on 0 to 10) scale:   0   / 10     ASSESSMENT  Assessment/Changes in Function:     No exacerbation of symptoms with today's session. []  See Progress Note/Recertification   Patient will continue to benefit from skilled PT services to modify and progress therapeutic interventions, address functional mobility deficits, address ROM deficits, address strength deficits, analyze and address soft tissue restrictions, analyze and cue movement patterns, analyze and modify body mechanics/ergonomics and assess and modify postural abnormalities to attain remaining goals. Progress toward goals / Updated goals:    No change in progress toward LTG's with today's session.       PLAN  [x]  Upgrade activities as tolerated YES Continue plan of care   []  Discharge due to :    []  Other:      Therapist: Agusto Anderson PTA    Date: 6/15/2017 Time: 11:38 AM     Future Appointments  Date Time Provider Adelaida Harper   6/19/2017 2:00 PM Maicol Madden MD [id number]   6/20/2017 11:00 AM Agusto Anderson PTA [facility]   6/22/2017 11:00 AM Agusto Anderson PTA [facility]

## 2023-01-29 NOTE — ED PROVIDER NOTES
Encounter Date: 1/28/2023       History     Chief Complaint   Patient presents with    Dizziness     Started today.      87-year-old female presents emergency department with dizziness.  She says that she is been dizzy since earlier today.  She describes it as off balance type feeling.  She says that it started earlier today.  His can not come and went.  She denies any recent illnesses or any recent head injury.  She is unsure if it is worse with changing positions or turning her head from side-to-side.  She is not have any associated chest pain or shortness of breath.  She does not have any fever or chills.  No abdominal pain vomiting or any diarrhea.  Patient says that she is no medical problems and takes no medications.    Review of patient's allergies indicates:  No Known Allergies  Past Medical History:   Diagnosis Date    Allergies     Essential (primary) hypertension      History reviewed. No pertinent surgical history.  Family History   Problem Relation Age of Onset    No Known Problems Mother     Cancer Neg Hx     Diabetes Neg Hx     Heart disease Neg Hx     Hypertension Neg Hx      Social History     Tobacco Use    Smoking status: Never    Smokeless tobacco: Never   Substance Use Topics    Alcohol use: Never    Drug use: Never     Review of Systems   Constitutional:  Negative for chills and fever.   HENT:  Negative for congestion and sore throat.    Respiratory:  Negative for shortness of breath.    Cardiovascular:  Negative for chest pain and palpitations.   Gastrointestinal:  Negative for abdominal pain, nausea and vomiting.   Genitourinary:  Negative for dysuria.   Musculoskeletal:  Negative for back pain.   Skin:  Negative for rash.   Neurological:  Positive for dizziness. Negative for weakness and headaches.   Hematological:  Does not bruise/bleed easily.   All other systems reviewed and are negative.    Physical Exam     Initial Vitals [01/28/23 1700]   BP Pulse Resp Temp SpO2   (!) 200/125 91 18  98.6 °F (37 °C) 100 %      MAP       --         Physical Exam    Nursing note and vitals reviewed.  Constitutional: She appears well-developed and well-nourished.   HENT:   Head: Normocephalic and atraumatic.   Mouth/Throat: No oropharyngeal exudate.   Right ear canal with cotton Q-tip in the ear canal.    Left ear within normal limits.   Eyes: Conjunctivae and EOM are normal. Pupils are equal, round, and reactive to light.   No Nystagmus   Neck: Neck supple. No tracheal deviation present.   Normal range of motion.  Cardiovascular:  Normal rate, regular rhythm, normal heart sounds and intact distal pulses.           No murmur heard.  Pulmonary/Chest: Breath sounds normal. No stridor. No respiratory distress. She has no wheezes. She has no rhonchi. She has no rales.   Abdominal: Abdomen is soft. She exhibits no distension. There is no abdominal tenderness. There is no rebound.   Musculoskeletal:         General: No tenderness or edema. Normal range of motion.      Cervical back: Normal range of motion and neck supple.     Neurological: She is alert and oriented to person, place, and time. She has normal strength. No cranial nerve deficit or sensory deficit. GCS score is 15. GCS eye subscore is 4. GCS verbal subscore is 5. GCS motor subscore is 6.   Patient with abnormal finger-to-nose bilaterally right seems to be worse than.  Speech is normal.  No facial droop noted.  5/5 strength in upper and lower extremities bilaterally.  No pronator drift   Skin: Skin is warm and dry. Capillary refill takes less than 2 seconds. No rash noted. No erythema. No pallor.   Psychiatric: She has a normal mood and affect. Her behavior is normal. Judgment and thought content normal.       ED Course   Foreign Body    Date/Time: 1/28/2023 10:33 PM  Performed by: Farzad Gibbs DO  Authorized by: Farzad Gibbs DO   Consent Done: Yes  Consent: Verbal consent obtained.  Risks and benefits: risks, benefits and alternatives were  discussed  Consent given by: patient  Patient identity confirmed: name  Body area: ear    Patient sedated: no  Patient restrained: no  Localization method: visualized  Removal mechanism: alligator forceps  Complexity: simple  1 objects recovered.  Objects recovered: cotton q tip  Post-procedure assessment: foreign body removed  Patient tolerance: Patient tolerated the procedure well with no immediate complications    Labs Reviewed   CBC W/ AUTO DIFFERENTIAL - Abnormal; Notable for the following components:       Result Value    MCHC 31.9 (*)     RDW 15.9 (*)     All other components within normal limits   COMPREHENSIVE METABOLIC PANEL - Abnormal; Notable for the following components:    Sodium 135 (*)     eGFR 48.6 (*)     All other components within normal limits   TROPONIN I HIGH SENSITIVITY   MAGNESIUM          Results for orders placed or performed during the hospital encounter of 01/28/23   CBC auto differential   Result Value Ref Range    WBC 4.23 3.90 - 12.70 K/uL    RBC 4.82 4.00 - 5.40 M/uL    Hemoglobin 13.2 12.0 - 16.0 g/dL    Hematocrit 41.4 37.0 - 48.5 %    MCV 86 82 - 98 fL    MCH 27.4 27.0 - 31.0 pg    MCHC 31.9 (L) 32.0 - 36.0 g/dL    RDW 15.9 (H) 11.5 - 14.5 %    Platelets 243 150 - 450 K/uL    MPV 10.8 9.2 - 12.9 fL    Immature Granulocytes 0.2 0.0 - 0.5 %    Gran # (ANC) 2.6 1.8 - 7.7 K/uL    Immature Grans (Abs) 0.01 0.00 - 0.04 K/uL    Lymph # 1.0 1.0 - 4.8 K/uL    Mono # 0.5 0.3 - 1.0 K/uL    Eos # 0.2 0.0 - 0.5 K/uL    Baso # 0.02 0.00 - 0.20 K/uL    nRBC 0 0 /100 WBC    Gran % 60.8 38.0 - 73.0 %    Lymph % 23.4 18.0 - 48.0 %    Mono % 11.1 4.0 - 15.0 %    Eosinophil % 4.0 0.0 - 8.0 %    Basophil % 0.5 0.0 - 1.9 %    Differential Method Automated    Comprehensive metabolic panel   Result Value Ref Range    Sodium 135 (L) 136 - 145 mmol/L    Potassium 4.2 3.5 - 5.1 mmol/L    Chloride 102 95 - 110 mmol/L    CO2 24 23 - 29 mmol/L    Glucose 88 70 - 110 mg/dL    BUN 17 8 - 23 mg/dL    Creatinine  1.1 0.5 - 1.4 mg/dL    Calcium 9.5 8.7 - 10.5 mg/dL    Total Protein 7.4 6.0 - 8.4 g/dL    Albumin 3.9 3.5 - 5.2 g/dL    Total Bilirubin 0.8 0.1 - 1.0 mg/dL    Alkaline Phosphatase 78 55 - 135 U/L    AST 23 10 - 40 U/L    ALT 11 10 - 44 U/L    Anion Gap 9 8 - 16 mmol/L    eGFR 48.6 (A) >60 mL/min/1.73 m^2   Troponin I High Sensitivity   Result Value Ref Range    Troponin I High Sensitivity 8.4 0.0 - 14.9 pg/mL   Magnesium   Result Value Ref Range    Magnesium 1.9 1.6 - 2.6 mg/dL       Imaging Results              CT Head Without Contrast (Final result)  Result time 01/28/23 21:42:18      Final result by Elliot Alberto MD (01/28/23 21:42:18)                   Narrative:    Head CT scan without contrast    COMPARISONS: None    ADDITIONAL PERTINENT HISTORY: Persistent dizziness    TECHNIQUE:  Multiple axial images were obtained from the skull base to the vertex without IV contrast. One of the following dose optimization techniques was utilized in the performance of this exam: Automated exposure control; adjustment of the mA and/or kV according to the patient's size; or use of an iterative  reconstruction technique.  Specific details can be referenced in the facility's radiology CT exam operational policy.    FINDINGS:    Midline shift: Negative    Ventricles:  Negative    Brain parenchyma:  Patchy hypoattenuation within the periventricular and subcortical white matter, nonspecific but likely representing small vessel ischemic change on a chronic basis. No intraparenchymal hemorrhage or mass effect.    Extra-axial spaces:  Mild cerebral atrophy.    Intracranial vasculature:  Cavernous internal carotid artery calcifications. Otherwise negative    Osseous structures:  Negative    Paranasal sinuses and mastoid air cells:  Negative    Surrounding soft tissues and orbits:  Negative      IMPRESSION:  1. Age related changes as described above.  2. No acute intracranial pathology    Electronically signed by:  Elliot Alberto MD   1/28/2023 9:42 PM [facility] Workstation: [id number]                                     Medications   labetaloL injection 10 mg (0 mg Intravenous Hold 1/28/23 2200)   aspirin tablet 325 mg (has no administration in time range)   aspirin chewable tablet 81 mg (has no administration in time range)   ondansetron injection 4 mg (4 mg Intravenous Given 1/28/23 2113)   meclizine tablet 25 mg (25 mg Oral Given 1/28/23 2113)   labetaloL injection 10 mg (10 mg Intravenous Given 1/28/23 2114)     Medical Decision Making:   Clinical Tests:   Lab Tests: Ordered and Reviewed  Radiological Study: Ordered and Reviewed  Medical Tests: Ordered and Reviewed  ED Management:  87-year-old female presents emergency department with elevated blood pressure and slight headache and dizziness.  Patient Mrs. On finger-to-nose on exam.  Patient has significantly elevated high blood pressure here in the emergency department she is received 10 mg of IV labetalol.  Do not see any evidence of any acute stroke on physical exam although she does miss on finger-to-nose.  She is able to walk in the ER however.  I wanted to improve her blood pressure which has improved down to 170/90 which is acceptable.  Patient will may need further inpatient treatment of her blood pressure further evaluation with MRI to rule out posterior circulation stroke.           ED Course as of 01/28/23 2234   Sat Jan 28, 2023 2107 EKG 5:06 p.m. normal sinus rhythm rate of 85.  No ST elevation or depression.  No evidence of ischemia.  Normal intervals.  No STEMI.  EKG interpreted independently by me. [JR]   2139 Patient has ambulated to the bathroom unassisted without difficulty per nursing staff. [JR]   2217 Jun from [facility] will admit the patient [JR]      ED Course User Index  [JR] Farzad Gibbs DO                   Clinical Impression:   Final diagnoses:  [R42] Dizziness (Primary)  [I16.0] Hypertensive urgency  [T16.1XXA] Foreign body of right ear, initial  encounter        ED Disposition Condition    Observation Stable                Farzad Gibbs,   01/28/23 2883

## 2023-01-29 NOTE — PLAN OF CARE
Problem: Occupational Therapy  Goal: Occupational Therapy Goal  Description: Goals to be met by: 2/15/2023     Patient will increase functional independence with ADLs by performing:    LE Dressing with Modified [place].  Grooming while standing at sink with Modified [place].  Toileting from toilet with Modified [place] for hygiene and clothing management.   Toilet transfer to toilet with Modified [place].    Outcome: Ongoing, Progressing

## 2023-01-29 NOTE — ASSESSMENT & PLAN NOTE
Systolic blood pressure ranges from 217-168, diastolic range , Mean arterial pressure 106-140, given 10 mg lipid the per stroke orders with labetalol and allow for permissive hypertension

## 2023-01-29 NOTE — PLAN OF CARE
Problem: Adult Inpatient Plan of Care  Goal: Plan of Care Review  Outcome: Ongoing, Progressing  Goal: Patient-Specific Goal (Individualized)  Outcome: Ongoing, Progressing  Goal: Absence of Hospital-Acquired Illness or Injury  Outcome: Ongoing, Progressing  Goal: Optimal Comfort and Wellbeing  Outcome: Ongoing, Progressing  Goal: Readiness for Transition of Care  Outcome: Ongoing, Progressing     Problem: Adjustment to Illness (Stroke, Ischemic/Transient Ischemic Attack)  Goal: Optimal Coping  Outcome: Ongoing, Progressing     Problem: Bowel Elimination Impaired (Stroke, Ischemic/Transient Ischemic Attack)  Goal: Effective Bowel Elimination  Outcome: Ongoing, Progressing     Problem: Cerebral Tissue Perfusion (Stroke, Ischemic/Transient Ischemic Attack)  Goal: Optimal Cerebral Tissue Perfusion  Outcome: Ongoing, Progressing     Problem: Cognitive Impairment (Stroke, Ischemic/Transient Ischemic Attack)  Goal: Optimal Cognitive Function  Outcome: Ongoing, Progressing     Problem: Communication Impairment (Stroke, Ischemic/Transient Ischemic Attack)  Goal: Improved Communication Skills  Outcome: Ongoing, Progressing     Problem: Functional Ability Impaired (Stroke, Ischemic/Transient Ischemic Attack)  Goal: Optimal Functional Ability  Outcome: Ongoing, Progressing     Problem: Respiratory Compromise (Stroke, Ischemic/Transient Ischemic Attack)  Goal: Effective Oxygenation and Ventilation  Outcome: Ongoing, Progressing     Problem: Sensorimotor Impairment (Stroke, Ischemic/Transient Ischemic Attack)  Goal: Improved Sensorimotor Function  Outcome: Ongoing, Progressing     Problem: Swallowing Impairment (Stroke, Ischemic/Transient Ischemic Attack)  Goal: Optimal Eating and Swallowing without Aspiration  Outcome: Ongoing, Progressing     Problem: Urinary Elimination Impaired (Stroke, Ischemic/Transient Ischemic Attack)  Goal: Effective Urinary Elimination  Outcome: Ongoing, Progressing     Problem: Fall Injury  Risk  Goal: Absence of Fall and Fall-Related Injury  Outcome: Ongoing, Progressing

## 2023-01-29 NOTE — HPI
Heidi Fine is an 87-year-old female with chronic medical problems including hypertension who presents to the emergency room complaining lightheadedness. Patient states that she was sitting down in a chair today and started feeling very dizzy.  She said she felt like she was going to pass out and her vision was starting to dim.  She said she has gotten dizzy frequently in the past and has always attributed to allergies.  Today her vision changed with this dizziness she has difficulty describing it.  The only reason she came to the hospital today for the dizziness was because it did not seem to go away.  She said she was sitting when it occurred so it did not happen when she stood up.  She denies any numbness or tingling, lateralized weakness, speech changes, chest pain or shortness of breath, or any other symptoms.  She said she used to take a blood pressure medicine but stopped it about a year ago.  She said her doctor told her she should take it just keep her blood pressure from getting high.  She periodically checks her blood pressure either DrMykel mcelroy or or in its never elevated.  She is not aware of any heart problems or high blood pressure or diabetes in her family and said her mother lived to be in her 90s and she is unsure about father.  She had an aunt who had a stroke.  She is only had a foot surgery and some other kind of surgery that she has difficulty describing that was more than 40 years ago.  She does not not smoke, drink alcohol or use any drugs.  She takes no medicine on a regular basis except for an aspirin occasionally.    In the ED, blood pressure was elevated at 2 11/84.  She was given 10 mg of labetalol and blood pressure was 168/94 with a map decreased from 140 to 122.  CT head negative for anything acute with some chronic age-related changes.  Twelve lead EKG with normal sinus rhythm, ventricular rate 85 and .  Lab work unremarkable with creatinine 1.1 and estimated GFR 48.6.  H&H  13.2/41.4, glucose 88, sodium 135, troponin 8.4.  She will be admitted to the hospitalist service for further evaluation and treatment with a consult Neurology.

## 2023-01-29 NOTE — PT/OT/SLP EVAL
Speech Language Pathology Evaluation  Bedside Swallow Evaluation/Cognitive linguistic Evaluation     Patient Name:  Heidi Fine   MRN:  [id number]  Admitting Diagnosis: Dizziness and giddiness    Recommendations:                 General Recommendations:  Follow-up not indicated  Diet recommendations:  Regular, Thin   Aspiration Precautions: Alternating bites/sips, Frequent oral care, HOB to 90 degrees, and Meds whole 1 at a time   General Precautions: Standard    Communication strategies:  none    History:     Past Medical History:   Diagnosis Date    Allergies     Essential (primary) hypertension        Past Surgical History:   Procedure Laterality Date    FOOT SURGERY         Social History: Patient lives with son.    Prior diet: Per pt, her diet was unrestricted w/regard to modified textures and/or consistencies.     Subjective     Pt awake/alert,oriented to self, place, situation. Son present at b/s. Per pt's son she demo prior level of function of language function. At b/s she responded to ST questions appropriately and demo topic maintenance WFL for the duration of treatment.     Patient goals: None stated     Pain/Comfort:  Pain Rating 1: 0/10    Respiratory Status: Room air    Objective:     Oral Musculature Evaluation  Oral Musculature: WFL  Dentition: present and adequate  Secretion Management: adequate  Mucosal Quality: good  Mandibular Strength and Mobility: WFL  Oral Labial Strength and Mobility: WFL  Lingual Strength and Mobility: WFL  Velar Elevation: WFL  Buccal Strength and Mobility: WFL  Volitional Cough: WFL  Volitional Swallow: WFL  Voice Prior to PO Intake: Clear    Bedside Swallow Eval:   Consistencies Assessed:  Thin liquids - 4oz via cup edge, straw  Puree - x5tsp  Soft solids - x3      Oral Phase:   WFL    Pharyngeal Phase:   no overt clinical signs/symptoms of aspiration  no overt clinical signs/symptoms of pharyngeal dysphagia    Compensatory Strategies  None    Treatment: Across PO trials  presented at b/s this date, pt demo adequate swallowing function for continuation of PO diet.     Cognitive-Linguistic Eval Results:     Pt's long-term/short-term memory - WFL  Word retrieval for functional objects - WFL  Automatic speech for numbers, days of the week - WFL  Orientation - x3 (she required cues for temporal info)   Yes/no questions - WFL  Open ended questions - WFL  Pt's voice, and speech intelligibility were likewise noted - WFL   Assessment:     Heidi Fine is a 87 y.o. female with adequate swallowing function for continuation of LRD of regular consistency and thin liquids, she presents with cognitive-linguistic function at baseline level of function. No further ST services warranted at this time.   Goals:   Multidisciplinary Problems       SLP Goals       Not on file              Multidisciplinary Problems (Resolved)          Problem: SLP    Goal Priority Disciplines Outcome   SLP Goal   (Resolved)     SLP Met   Description: 1. Pt will tolerant LRD and thin liquids without overt s/s of aspiration.                        Plan:     Patient to be seen:      Plan of Care expires:     Plan of Care reviewed with:  patient, son   SLP Follow-Up:  No       Discharge recommendations:      Barriers to Discharge:  None    Time Tracking:     SLP Treatment Date:   01/29/23  Speech Start Time:  1142  Speech Stop Time:  1202     Speech Total Time (min):  20 min    Billable Minutes: Eval of Cognitive-linguistic function 10min  and Eval Swallow and Oral Function 10min    01/29/2023

## 2023-01-29 NOTE — PROGRESS NOTES
MRI of the Brain was done. Patient came in with elevated blood pressure and dizziness. Symptoms began on yesterday.

## 2023-01-29 NOTE — CONSULTS
[facility]  Department of Neurology  Neurology Consultation Note        PATIENT NAME: Heidi Fine  MRN: [id number]  CSN: [id number]      TODAY'S DATE: 01/29/2023  ADMIT DATE: 1/28/2023                            CONSULTING PROVIDER: Mayco Galeano MD  CONSULT REQUESTED BY: Anuj Javed MD      Reason for consult: Stroke       History obtained from chart review and the patient.    HPI per EMR: Heidi Fine is a 87 y.o. female with a history of hypertension who presents to the emergency room complaining lightheadedness. Patient states that she was sitting down in a chair today and started feeling very dizzy.  She said she felt like she was going to pass out and her vision was starting to dim.  She said she has gotten dizzy frequently in the past and has always attributed to allergies.  Today her vision changed with this dizziness she has difficulty describing it.  The only reason she came to the hospital today for the dizziness was because it did not seem to go away.  She said she was sitting when it occurred so it did not happen when she stood up.  She denies any numbness or tingling, lateralized weakness, speech changes, chest pain or shortness of breath, or any other symptoms.  She said she used to take a blood pressure medicine but stopped it about a year ago.  She said her doctor told her she should take it just keep her blood pressure from getting high.  She periodically checks her blood pressure either Dr. mcelroy or or in its never elevated.  She is not aware of any heart problems or high blood pressure or diabetes in her family and said her mother lived to be in her 90s and she is unsure about father.  She had an aunt who had a stroke.  She is only had a foot surgery and some other kind of surgery that she has difficulty describing that was more than 40 years ago.  She does not not smoke, drink alcohol or use any drugs.  She takes no medicine on a regular basis except for an aspirin  occasionally.     In the ED, blood pressure was elevated at 211/84.  She was given 10 mg of labetalol and blood pressure was 168/94 with a map decreased from 140 to 122.  CT head negative for anything acute with some chronic age-related changes    Neurology consult:  Patient was seen examined by me this morning.  She is alert and oriented times.  She states that yesterday she felt dizzy with room spinning sensation however no nausea or vomiting.  She feels she is intermittent dizziness and attributes to allergies (she is not able to tell me what energy).  She states she had some vision abnormality with dizziness however not able to describe it.  She denies any unilateral weakness or sensory changes.  She states she does not have any history of blood pressure and does not take any medications at home.    In the ER her blood pressure elevated improved with labetalol.  She states her dizziness is improved now.    PREVIOUS MEDICAL HISTORY:  Past Medical History:   Diagnosis Date    Allergies     Essential (primary) hypertension      PREVIOUS SURGICAL HISTORY:  Past Surgical History:   Procedure Laterality Date    FOOT SURGERY       FAMILY MEDICAL HISTORY:  Family History   Problem Relation Age of Onset    No Known Problems Mother     Stroke Maternal Aunt     Cancer Neg Hx     Diabetes Neg Hx     Heart disease Neg Hx     Hypertension Neg Hx      SOCIAL HISTORY:  Social History     Tobacco Use    Smoking status: Never    Smokeless tobacco: Never   Substance Use Topics    Alcohol use: Never    Drug use: Never     ALLERGIES:  Review of patient's allergies indicates:  No Known Allergies  HOME MEDICATIONS:  Prior to Admission medications    Not on File     CURRENT SCHEDULED MEDICATIONS:   amLODIPine  5 mg Oral Daily    aspirin  81 mg Oral Daily    atorvastatin  40 mg Oral Daily    enoxaparin  40 mg Subcutaneous Daily     CURRENT INFUSIONS:    CURRENT PRN MEDICATIONS:  acetaminophen, hydrALAZINE, labetalol, magnesium oxide,  magnesium oxide, ondansetron, polyethylene glycol, potassium bicarbonate, potassium bicarbonate, potassium bicarbonate, potassium, sodium phosphates, potassium, sodium phosphates, potassium, sodium phosphates, sodium chloride 0.9%    REVIEW OF SYSTEMS:  Please refer to the HPI for all pertinent positive and negative findings. A comprehensive review of all other systems was negative.       PHYSICAL EXAM:  Patient Vitals for the past 24 hrs:   BP Temp Temp src Pulse Resp SpO2 Height Weight   01/29/23 0730 (!) 173/71 97.9 °F (36.6 °C) Oral 82 18 (!) 94 % -- --   01/29/23 0554 (!) 195/81 -- -- 69 -- -- -- --   01/29/23 0504 (!) 202/88 97.3 °F (36.3 °C) Oral 71 18 -- -- --   01/29/23 0239 -- -- -- -- -- -- -- 76 kg (167 lb 8.8 oz)   01/29/23 0237 (!) 204/87 98.4 °F (36.9 °C) Oral 67 18 100 % -- --   01/29/23 0200 (!) 218/82 -- -- 68 18 100 % -- --   01/29/23 0115 (!) 191/71 -- -- 64 18 97 % -- --   01/29/23 0045 (!) 198/85 -- -- 70 20 97 % -- --   01/29/23 0030 (!) 187/84 -- -- 64 16 96 % -- --   01/29/23 0015 (!) 191/83 -- -- 63 16 96 % -- --   01/29/23 0002 -- -- -- -- -- 95 % -- --   01/29/23 0000 (!) 207/81 -- -- 64 11 (!) 93 % -- --   01/28/23 2345 (!) 203/69 -- -- 66 17 (!) 94 % -- --   01/28/23 2330 (!) 220/93 -- -- 64 13 98 % -- --   01/28/23 2326 (!) 224/95 -- -- 65 -- 98 % -- --   01/28/23 2324 -- -- -- 66 20 98 % -- --   01/28/23 2323 -- -- -- 65 14 95 % -- --   01/28/23 2250 (!) 189/80 -- -- 72 19 97 % -- --   01/28/23 2248 (!) 216/87 -- -- -- -- -- -- --   01/28/23 2240 (!) 216/87 -- -- 70 14 96 % -- --   01/28/23 2230 (!) 195/100 -- -- 76 (!) 28 99 % -- --   01/28/23 2221 (!) 168/94 -- -- 72 (!) 22 100 % -- --   01/28/23 2210 (!) 170/66 -- -- 68 16 96 % -- --   01/28/23 2200 (!) 168/94 -- -- 67 14 97 % -- --   01/28/23 2150 (!) 199/93 -- -- 73 19 97 % -- --   01/28/23 2146 (!) 218/85 -- -- 72 (!) 21 99 % -- --   01/28/23 2130 (!) 196/81 -- -- 66 14 96 % -- --   01/28/23 2122 -- -- -- -- 16 -- -- --  "  01/28/23 2121 (!) 171/75 -- -- 76 -- 98 % -- --   01/28/23 2119 (!) 186/76 -- -- 72 20 98 % -- --   01/28/23 2114 (!) 211/84 -- -- -- -- -- -- --   01/28/23 2022 (!) 227/98 -- -- 71 15 99 % -- --   01/28/23 2020 (!) 217/97 -- -- 76 -- 99 % -- --   01/28/23 1900 -- -- -- 73 (!) 26 97 % -- --   01/28/23 1830 (!) 189/81 -- -- 73 (!) 22 98 % -- --   01/28/23 1800 (!) 214/103 -- -- 68 17 (!) 94 % -- --   01/28/23 1733 -- -- -- -- 10 -- -- --   01/28/23 1731 (!) 204/86 -- -- 79 -- 98 % -- --   01/28/23 1713 (!) 193/91 -- -- 85 -- 98 % -- --   01/28/23 1700 (!) 200/125 98.6 °F (37 °C) Oral 91 18 100 % 5' 2" (1.575 m) 56.7 kg (125 lb)       GENERAL APPEARANCE: Alert, well-developed, well-nourished female in no acute distress.  HEENT: Normocephalic and atraumatic. PERRL. Oropharynx unremarkable.  PULM: Normal respiratory effort. No accessory muscle use.  CV: RRR.  ABDOMEN: Soft, nontender.  EXTREMITIES: No obvious signs of vascular compromise. Pulses present. No cyanosis, clubbing or edema.  SKIN: Clear; no rashes, lesions or skin breaks in exposed areas.    NEURO:  MENTAL STATUS: Patient awake and oriented to time, place, and person, recent/remote memory normal, attention span/concentration normal, speech fluent without paraphasic errors, good comprehension with appropriate thought content, and fund of knowledge appropriate for patient's level of education.  Affect euthymic.    CRANIAL NERVES:  CN I: Not tested.  CN II: Fundoscopic exam deferred.  CN III, IV, VI: Pupils equal, round and reactive to light.  Extraocular movements full and intact.  CN V: Facial sensation normal.  CN VII: Facial asymmetry absent.  CN VIII: Hearing grossly normal and equal bilaterally.  No skew deviation or pathologic nystagmus.  CN IX, X: Palate elevates symmetrically. Speech/articulation is clear without dysarthria.  CN XI: Shoulder shrug and chin rotation equal with good strength.  CN XII: Tongue protrusion midline.    MOTOR:  Bulk normal. " Tone normal and symmetric throughout.  Abnormal movements absent.  Tremor: none present.  Strength 5/5 throughout.    REFLEXES:  DTRs 2+ throughout.  Plantar response equivocal bilaterally.  SENSATION: grossly intact throughout.  COORDINATION: normal finger-to-nose.  STATION: not tested.  GAIT: not tested.      NIHSS:  1a      Level of Consciousness (alert, drowsy, etc.):   0=alert; keenly responsive  1b.     Level of Consciousness Questions (month, age): 0= able to answer both questions  1c.     Level of Consciousness Commands (open, close eyes, make fist, let go):  0=Answers both tasks correctly  2.      Best Gaze (eyes open - patient follows examiner's finger or face):      0=normal  3.      Visual (introduce visual stimulus/threat to patient's visual field quadrants):  0=No visual loss  4.      Facial Palsy (show teeth, raise eyebrows and squeeze eyes shut):        0=Normal symmetric movement  5a.     Motor Arm - Left (elevate extremity 90 degrees and score drift/movement):       0=No drift, limb holds 90 (or 45) degrees for full 10 seconds  5b.     Motor Arm - Right (elevate extremity 90 degrees and score drift/movement):      0=No drift, limb holds 90 (or 45) degrees for full 10 seconds  6a.     Motor Leg - Left (elevate extremity 30 degrees and score drift/movement):       0=No drift, limb holds 90 (or 45) degrees for full 10 seconds  6b      Motor Leg - Right (elevate extremity 30 degrees and score drift/movement):      0=No drift, limb holds 90 (or 45) degrees for full 10 seconds  7.      Limb Ataxia (finger-nose, heel down shin):      0=Absent  8.      Sensory (pin prick to face, arm, trunk, and leg - compare side to side):        0=Normal; no sensory loss  9.      Best Language (name items, describe a picture and read sentences):      0=No aphasia, normal  10.     Dysarthria (evaluate speech clarity by patient repeating listed words): 0=Normal  11.     Extinction and Inattention (use prior testing to  identify neglect or double simultaneous stimuli testing):      0=No abnormality          NIH Stroke Scale Total:         0      Labs:  Recent Labs   Lab 01/28/23 2027 01/29/23  0501   * 135*   K 4.2 3.5    102   CO2 24 25   BUN 17 14   CREATININE 1.1 1.0   GLU 88 93   CALCIUM 9.5 9.3   PHOS  --  2.5*   MG 1.9 1.9     Recent Labs   Lab 01/28/23 2027 01/29/23  0501   WBC 4.23 3.44*   HGB 13.2 13.2   HCT 41.4 41.3    227     Recent Labs   Lab 01/28/23 2027 01/29/23  0501   ALBUMIN 3.9 3.7   PROT 7.4 7.0   BILITOT 0.8 0.8   ALKPHOS 78 71   ALT 11 11   AST 23 20     Lab Results   Component Value Date    INR 1.0 01/29/2023     Lab Results   Component Value Date    TRIG 35 01/29/2023    HDL 47 01/29/2023    CHOLHDL 30.5 01/29/2023     Lab Results   Component Value Date    HGBA1C 6.0 01/29/2023     No results found for: PROTEINCSF, GLUCCSF, WBCCSF    Imaging:  I have reviewed and interpreted the pertinent imaging and lab results.      US Carotid Bilateral  CAROTID DOPPLER ULTRASOUND    CLINICAL DATA:Stroke    CMS MANDATED QUALITY DATA - CAROTID - 195    All measurements and percent stenosis described below were determined using NASCET criteria or criteria similar to NASCET, as defined by the Society of Radiologists in Ultrasound Consensus Conference, Radiology, 2003    FINDINGS: Grayscale, color, and spectral Doppler analysis was performed.    There is moderate plaque formation at both carotid bulbs, without evidence of hemodynamically significant stenosis.    Peak systolic velocity in the right ICA is 102 cm/s, with ICA/CCA  systolic ratio of 1.3.    Peak systolic velocity in the left ICA is 80 cm/s, with ICA/CCA systolic ratio of 0.8.    Antegrade flow is noted in both vertebral arteries.    IMPRESSION:    1. Moderate plaque formation at both carotid bulbs, with no Doppler evidence of hemodynamically significant stenosis.    Electronically signed by:  Joon Matos MD  1/29/2023 7:09 AM CST  Workstation: [id number]         ASSESSMENT & PLAN:      Acute ischemic cerebellar infarct    Workup  CTH: No acute intracranial abnormality   CTA head and neck: pending   MRI brain:  Small acute infarct in the right cerebellum  ECHO:  pending   LDL: 100  HbA1c: 6.0     Plan  Admitted for further stroke workup.  Etiology of stroke- workup pending  Start with Aspirin 81 mg, Plavix 70 mg and Lipitor 80mg.  Dual antiplatelet therapy for 3 weeks followed by monotherapy with aspirin alone.   CT angiogram head and neck pending and if no significant atherosclerotic disease to explain the stroke, patient will need outpatient Holter monitor for 4 weeks to rule out AFib cause of embolic stroke  Permissive BP to 220 systolic for 24 hrs from symptom onset and after that normalize BP  PT OT  Speech therapy  DVT prophylaxis with chemo/SCD prophylaxis  Discussed lifestyle modifications as prophylactic measures for stroke prevention including, adequate blood pressure management, healthy diet and regular exercise.          Thank you kindly for including us in the care of this patient. Please do not hesitate to contact us with any questions.        Mayco Galeano MD  Neurology/vascular Neurology  Date of Service: 01/29/2023  10:00 AM    --------------------------------------------------------------------------------------------------------------------------------------------------------------------------------------------------------------------------------------------------------------  Please note: This note was transcribed using voice recognition software. Because of this technology there are often uinintended grammatical, spelling, and other transcription errors. Please disregard these errors.

## 2023-01-29 NOTE — PLAN OF CARE
01/29/23 1530   DAVEY Message   Medicare Outpatient and Observation Notification regarding financial responsibility Given to patient/caregiver;Explained to patient/caregiver;Signed/date by patient/caregiver   Date DAVEY was signed 01/29/23   Time DAVEY was signed 1537

## 2023-01-29 NOTE — ASSESSMENT & PLAN NOTE
Creatinine 1.1, creatinine clearance estimated 28.5, estimated GFR 48.6, obtain UA     DISPLAY PLAN FREE TEXT

## 2023-01-29 NOTE — H&P
[facility] - Emergency Dept  Hospital Medicine  History & Physical    Patient Name: Heidi Fine  MRN: [id number]  Patient Class: OP- Observation  Admission Date: 1/28/2023  Attending Physician: Padmini Dave MD   Primary Care Provider: Primary Doctor No         Patient information was obtained from patient and ER records.     Subjective:     Principal Problem:Dizziness and giddiness    Chief Complaint:   Chief Complaint   Patient presents with    Dizziness     Started today.         HPI: Heidi Fine is an 87-year-old female with chronic medical problems including hypertension who presents to the emergency room complaining lightheadedness. Patient states that she was sitting down in a chair today and started feeling very dizzy.  She said she felt like she was going to pass out and her vision was starting to dim.  She said she has gotten dizzy frequently in the past and has always attributed to allergies.  Today her vision changed with this dizziness she has difficulty describing it.  The only reason she came to the hospital today for the dizziness was because it did not seem to go away.  She said she was sitting when it occurred so it did not happen when she stood up.  She denies any numbness or tingling, lateralized weakness, speech changes, chest pain or shortness of breath, or any other symptoms.  She said she used to take a blood pressure medicine but stopped it about a year ago.  She said her doctor told her she should take it just keep her blood pressure from getting high.  She periodically checks her blood pressure either DrMykel mcelroy or or in its never elevated.  She is not aware of any heart problems or high blood pressure or diabetes in her family and said her mother lived to be in her 90s and she is unsure about father.  She had an aunt who had a stroke.  She is only had a foot surgery and some other kind of surgery that she has difficulty describing that was more than 40 years ago.  She  does not not smoke, drink alcohol or use any drugs.  She takes no medicine on a regular basis except for an aspirin occasionally.    In the ED, blood pressure was elevated at 2 11/84.  She was given 10 mg of labetalol and blood pressure was 168/94 with a map decreased from 140 to 122.  CT head negative for anything acute with some chronic age-related changes.  Twelve lead EKG with normal sinus rhythm, ventricular rate 85 and .  Lab work unremarkable with creatinine 1.1 and estimated GFR 48.6.  H&H 13.2/41.4, glucose 88, sodium 135, troponin 8.4.  She will be admitted to the hospitalist service for further evaluation and treatment with a consult Neurology.      Past Medical History:   Diagnosis Date    Allergies     Essential (primary) hypertension        Past Surgical History:   Procedure Laterality Date    FOOT SURGERY         Review of patient's allergies indicates:  No Known Allergies    No current facility-administered medications on file prior to encounter.     No current outpatient medications on file prior to encounter.     Family History       Problem Relation (Age of Onset)    No Known Problems Mother    Stroke Maternal Aunt          Tobacco Use    Smoking status: Never    Smokeless tobacco: Never   Substance and Sexual Activity    Alcohol use: Never    Drug use: Never    Sexual activity: Not on file     Review of Systems   All other systems reviewed and are negative.  Objective:     Vital Signs (Most Recent):  Temp: 98.6 °F (37 °C) (01/28/23 1700)  Pulse: 72 (01/28/23 2221)  Resp: (!) 22 (01/28/23 2221)  BP: (!) 168/94 (01/28/23 2221)  SpO2: 100 % (01/28/23 2221)   Vital Signs (24h Range):  Temp:  [98.6 °F (37 °C)] 98.6 °F (37 °C)  Pulse:  [66-91] 72  Resp:  [10-22] 22  SpO2:  [94 %-100 %] 100 %  BP: (168-227)/() 168/94     Weight: 56.7 kg (125 lb)  Body mass index is 22.86 kg/m².    Physical Exam  Vitals and nursing note reviewed.   Constitutional:       General: She is not in acute  distress.     Appearance: Normal appearance. She is not ill-appearing.      Comments: Elderly female, lying in bed awake alert, she is in no acute distress in his a fairly good historian.  She is accompanied by her son.   HENT:      Head: Normocephalic.      Right Ear: External ear normal.      Left Ear: External ear normal.      Ears:      Comments: A small piece of cotton was removed from her right ear by ED physician.     Nose: Nose normal.      Mouth/Throat:      Mouth: Mucous membranes are moist.      Pharynx: Oropharynx is clear.   Eyes:      Conjunctiva/sclera: Conjunctivae normal.   Cardiovascular:      Rate and Rhythm: Normal rate and regular rhythm.      Pulses: Normal pulses.      Heart sounds: Normal heart sounds. No murmur heard.  Pulmonary:      Effort: Pulmonary effort is normal.      Breath sounds: Normal breath sounds. No wheezing or rhonchi.   Abdominal:      General: Bowel sounds are normal.      Palpations: Abdomen is soft.      Tenderness: There is no abdominal tenderness.   Musculoskeletal:         General: Normal range of motion.      Cervical back: Normal range of motion.      Comments: Moves all extremities with good equal strength, no drift.   Skin:     General: Skin is warm and dry.      Capillary Refill: Capillary refill takes less than 2 seconds.   Neurological:      General: No focal deficit present.      Mental Status: She is alert and oriented to person, place, and time.      Sensory: No sensory deficit.      Motor: No weakness.      Comments: Mildly impaired coordination   Psychiatric:         Mood and Affect: Mood normal.         Behavior: Behavior normal.         Thought Content: Thought content normal.         Judgment: Judgment normal.           Significant Labs: All pertinent labs within the past 24 hours have been reviewed.    BMP:   Recent Labs   Lab 01/28/23 2027   GLU 88   *   K 4.2      CO2 24   BUN 17   CREATININE 1.1   CALCIUM 9.5   MG 1.9     CBC:   Recent  Labs   Lab 01/28/23 2027   WBC 4.23   HGB 13.2   HCT 41.4        CMP:   Recent Labs   Lab 01/28/23 2027   *   K 4.2      CO2 24   GLU 88   BUN 17   CREATININE 1.1   CALCIUM 9.5   PROT 7.4   ALBUMIN 3.9   BILITOT 0.8   ALKPHOS 78   AST 23   ALT 11   ANIONGAP 9     Magnesium:   Recent Labs   Lab 01/28/23 2027   MG 1.9       Troponin:   Recent Labs   Lab 01/28/23 2027   TROPONINIHS 8.4         Significant Imaging: I have reviewed all pertinent imaging results/findings within the past 24 hours.    Head CT scan without contrast     COMPARISONS: None     ADDITIONAL PERTINENT HISTORY: Persistent dizziness     TECHNIQUE:  Multiple axial images were obtained from the skull base to the vertex without IV contrast. One of the following dose optimization techniques was utilized in the performance of this exam: Automated exposure control; adjustment of the mA and/or kV according to the patient's size; or use of an iterative  reconstruction technique.  Specific details can be referenced in the facility's radiology CT exam operational policy.     FINDINGS:     Midline shift: Negative     Ventricles:  Negative     Brain parenchyma:  Patchy hypoattenuation within the periventricular and subcortical white matter, nonspecific but likely representing small vessel ischemic change on a chronic basis. No intraparenchymal hemorrhage or mass effect.     Extra-axial spaces:  Mild cerebral atrophy.     Intracranial vasculature:  Cavernous internal carotid artery calcifications. Otherwise negative     Osseous structures:  Negative     Paranasal sinuses and mastoid air cells:  Negative     Surrounding soft tissues and orbits:  Negative       IMPRESSION:   1. Age related changes as described above.   2. No acute intracranial pathology       Assessment/Plan:     * Dizziness and giddiness  Patient with dizziness, vision changes, feeling better at time of exam, concern for a posterior CVA, we will admit to telemetry with  stroke protocol, obtain MRI, obtain echocardiogram and carotid ultrasound, consult Neurology, neuro checks and vital sign checks per stroke orders, NPO until passes bedside swallow, PT OT ST evaluation, give 325 mg aspirin now and 81 mg aspirin daily, start atorvastatin once daily, labetalol for blood pressure management, obtain lipid panel, hemoglobin A1c in a.m.      Chronic kidney disease, stage 3  Creatinine 1.1, creatinine clearance estimated 28.5, estimated GFR 48.6, obtain UA      Uncontrolled hypertension  Systolic blood pressure ranges from 217-168, diastolic range , Mean arterial pressure 106-140, given 10 mg lipid the per stroke orders with labetalol and allow for permissive hypertension        VTE Risk Mitigation (From admission, onward)    None         VTE prophylaxis:  SCDs and enoxaparin 40 mg subcu every 24 hours    Patient was examined at 2219    Code status: FULL CODE      Lilia Olmstead NP  Department of Hospital Medicine   [facility] - Emergency Dept

## 2023-01-30 PROCEDURE — 63600175 PHARM REV CODE 636 W HCPCS: Performed by: NURSE PRACTITIONER

## 2023-01-30 PROCEDURE — 97535 SELF CARE MNGMENT TRAINING: CPT

## 2023-01-30 PROCEDURE — 21400001 HC TELEMETRY ROOM

## 2023-01-30 PROCEDURE — 97116 GAIT TRAINING THERAPY: CPT

## 2023-01-30 PROCEDURE — 63600175 PHARM REV CODE 636 W HCPCS: Performed by: STUDENT IN AN ORGANIZED HEALTH CARE EDUCATION/TRAINING PROGRAM

## 2023-01-30 PROCEDURE — 25000003 PHARM REV CODE 250: Performed by: NURSE PRACTITIONER

## 2023-01-30 PROCEDURE — 25000003 PHARM REV CODE 250: Performed by: INTERNAL MEDICINE

## 2023-01-30 RX ORDER — LISINOPRIL 10 MG/1
10 TABLET ORAL DAILY
Status: DISCONTINUED | OUTPATIENT
Start: 2023-01-30 | End: 2023-01-31 | Stop reason: HOSPADM

## 2023-01-30 RX ADMIN — POTASSIUM BICARBONATE 50 MEQ: 977.5 TABLET, EFFERVESCENT ORAL at 09:01

## 2023-01-30 RX ADMIN — ENOXAPARIN SODIUM 40 MG: 100 INJECTION SUBCUTANEOUS at 06:01

## 2023-01-30 RX ADMIN — HYDRALAZINE HYDROCHLORIDE 10 MG: 20 INJECTION INTRAMUSCULAR; INTRAVENOUS at 04:01

## 2023-01-30 RX ADMIN — ATORVASTATIN CALCIUM 40 MG: 40 TABLET, FILM COATED ORAL at 10:01

## 2023-01-30 RX ADMIN — AMLODIPINE BESYLATE 10 MG: 5 TABLET ORAL at 09:01

## 2023-01-30 RX ADMIN — ASPIRIN 81 MG CHEWABLE TABLET 81 MG: 81 TABLET CHEWABLE at 09:01

## 2023-01-30 RX ADMIN — LISINOPRIL 10 MG: 10 TABLET ORAL at 03:01

## 2023-01-30 RX ADMIN — CLOPIDOGREL BISULFATE 75 MG: 75 TABLET, FILM COATED ORAL at 09:01

## 2023-01-30 NOTE — PROGRESS NOTES
[facility] Medicine  Progress Note    Patient name: Heidi Fine  MRN: [id number]  Admit Date: 1/28/2023   LOS: 0 days     SUBJECTIVE:     Principal problem: CVA (cerebral vascular accident)    Interval History:  87 year old female admitted with dizziness and elevated blood pressure. MRI of the brain revealed small right cerebellar hemisphere ischemic infarct.  Neurology, PT/OT/ST consulted. 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 Echo negative for PFO and no emoblic source but did show moderate asymmetric hypertrophy of the basal septum. 24 hours of permissive HTN allowed and then started on Norvasc with up titration for better blood pressure control. ASA, Plavix, Lipitor for secondary prevention. Therapy recommends HH.     She tells me she is no longer dizzy.    Hospital Course:    Scheduled Meds:   [START ON 1/30/2023] amLODIPine  10 mg Oral Daily    aspirin  81 mg Oral Daily    atorvastatin  40 mg Oral Daily    clopidogreL  75 mg Oral Daily    enoxaparin  40 mg Subcutaneous Daily     Continuous Infusions:  PRN Meds:acetaminophen, hydrALAZINE, labetalol, magnesium oxide, magnesium oxide, ondansetron, polyethylene glycol, potassium bicarbonate, potassium bicarbonate, potassium bicarbonate, potassium, sodium phosphates, potassium, sodium phosphates, potassium, sodium phosphates, sodium chloride 0.9%    Review of patient's allergies indicates:  No Known Allergies    Review of Systems: As per interval history    OBJECTIVE:     Vital Signs (Most Recent)  Temp: 97.5 °F (36.4 °C) (01/29/23 1517)  Pulse: 68 (01/29/23 1517)  Resp: 18 (01/29/23 1517)  BP: (!) 154/70 (01/29/23 1517)  SpO2: 96 % (01/29/23 1517)    Vital Signs Range (Last 24H):  Temp:  [97.3 °F (36.3 °C)-98.4 °F (36.9 °C)]   Pulse:  [63-82]   Resp:  [11-28]   BP: (154-227)/()   SpO2:  [93 %-100 %]     I & O (Last 24H):  Intake/Output Summary (Last 24 hours) at  1/29/2023 1902  Last data filed at 1/29/2023 1714  Gross per 24 hour   Intake 480 ml   Output 250 ml   Net 230 ml       Physical Exam:    Vitals and nursing note reviewed.     Constitutional:       General: Not in acute distress.     Appearance: Well-developed.   HENT:      Head: Normocephalic and atraumatic.   Eyes:      Pupils: Pupils are equal, round, and reactive to light.   Cardiovascular:      Rate and Rhythm: Regular rhythm.   Pulmonary:      Effort: Pulmonary effort is normal.      Breath sounds: Normal breath sounds. No wheezing.   Abdominal:      General: There is no distension.      Palpations: Abdomen is soft.      Tenderness: There is no abdominal tenderness. There is no guarding or rebound.   Musculoskeletal:         General: Normal range of motion.      Cervical back: Normal range of motion.   Skin:     Findings: No rash.   Neurological:      Mental Status: Alert and oriented to person, place, and time.      Cranial Nerves: No cranial nerve deficit.      Sensory: No sensory deficit.     Laboratory:  I have reviewed all pertinent lab results within the past 24 hours.  CBC:   Recent Labs   Lab 01/29/23  0501   WBC 3.44*   RBC 4.88   HGB 13.2   HCT 41.3      MCV 85   MCH 27.0   MCHC 32.0     BMP:   Recent Labs   Lab 01/29/23  0501   GLU 93   *   K 3.5      CO2 25   BUN 14   CREATININE 1.0   CALCIUM 9.3   MG 1.9     Coagulation:   Recent Labs   Lab 01/29/23  0501   LABPROT 10.5   INR 1.0   APTT 24.4     Cardiac markers:   Recent Labs   Lab 01/29/23  0501   CPKMB 2.9     Microbiology Results (last 7 days)       ** No results found for the last 168 hours. **            Diagnostic Results:  MRI: Reviewed  MRI brain with acute ischemic CVA right cerebellar hemisphere     ASSESSMENT/PLAN:         Active Hospital Problems    Diagnosis  POA    *CVA (cerebral vascular accident) [I63.9]  Yes    Uncontrolled hypertension [I10]  Yes    Chronic kidney disease, stage 3 [N18.30]  Yes    Dizziness and  giddiness [R42]  Yes      Resolved Hospital Problems   No resolved problems to display.         Plan:     -ASA and plavix for 3 weeks followed by asa alone. Statin.    -Norvasc added and titrated up for better blood pressure control. S/p permissive HTN.  -Neuro checks  -PT/OT/ST  -Echo with no PFO or embolic source.  -Patient fairly adamant that she has no Hx of HTN.  Long discussion where we discussed that she does have this diagnosis now and will need to take medication for it. Dietician consult for information on low salt diet.  -Education on current medication and importance of taking these  -Lovenox          VTE Risk Mitigation (From admission, onward)           Ordered     enoxaparin injection 40 mg  Daily         01/29/23 0318     IP VTE HIGH RISK PATIENT  Once         01/29/23 0318     Place sequential compression device  Until discontinued         01/29/23 0318                      Department Hospital Medicine  [facility]  Anuj Javed MD  Date of service: 01/29/2023

## 2023-01-30 NOTE — PT/OT/SLP PROGRESS
Occupational Therapy   Treatment    Name: Heidi Fine  MRN: [id number]  Admitting Diagnosis:  CVA (cerebral vascular accident)       Recommendations:     Discharge Recommendations: home health OT  Discharge Equipment Recommendations:  bath bench  Barriers to discharge:  None    Assessment:     Heidi Fine is a 87 y.o. female with a medical diagnosis of CVA (cerebral vascular accident).  Performance deficits affecting function are impaired functional mobility, impaired balance, decreased lower extremity function, decreased safety awareness, impaired coordination. Patient states she is doing well and ready to go home.     Rehab Prognosis:  Fair; patient would benefit from acute skilled OT services to address these deficits and reach maximum level of function.       Plan:     Patient to be seen 5 x/week to address the above listed problems via self-care/home management, therapeutic activities, therapeutic exercises  Plan of Care Expires: 02/26/23  Plan of Care Reviewed with: patient, son    Subjective     Pain/Comfort:  Pain Rating 1: 0/10  Pain Rating Post-Intervention 1: 0/10    Objective:     Communicated with: nurse prior to session.  Patient found HOB elevated with bed alarm, telemetry upon OT entry to room.    General Precautions: Standard, fall    Orthopedic Precautions:N/A  Braces: N/A  Respiratory Status: Room air     Occupational Performance:     Bed Mobility:    Patient completed Rolling/Turning to Right with supervision  Patient completed Scooting/Bridging with supervision  Patient completed Supine to Sit with supervision     Functional Mobility/Transfers:  Patient completed Sit <> Stand Transfer with contact guard assistance  with  no assistive device   Functional Mobility: CGA for ambulation to bathroom    Activities of Daily Living:  Grooming: contact guard assistance washing hands at sink in standing position  Toileting: stand by assistance clothing management and hygiene    Treatment & Education:  Reviewed  equipment needs with patient and son, safety during ADLs and functional mobility, and reviewed use of call bell for assistance.     Patient left sitting edge of bed with all lines intact, call button in reach, and physical therapist and son present    GOALS:   Multidisciplinary Problems       Occupational Therapy Goals          Problem: Occupational Therapy    Goal Priority Disciplines Outcome Interventions   Occupational Therapy Goal     OT, PT/OT Ongoing, Progressing    Description: Goals to be met by: 2/15/2023     Patient will increase functional independence with ADLs by performing:    LE Dressing with Modified Media.  Grooming while standing at sink with Modified Media.  Toileting from toilet with Modified Media for hygiene and clothing management.   Toilet transfer to toilet with Modified Media.                         Time Tracking:     OT Date of Treatment: 01/30/23  OT Start Time: 0928  OT Stop Time: 0951  OT Total Time (min): 23 min    Billable Minutes:Self Care/Home Management 23    OT/RANI: OT          1/30/2023

## 2023-01-30 NOTE — PROGRESS NOTES
[facility]  Department of Neurology  Neurology Progress Note        PATIENT NAME: Heidi Fine  MRN: [id number]  CSN: [id number]      TODAY'S DATE: 01/30/2023  ADMIT DATE: 1/28/2023                            CONSULTING PROVIDER: Rut Cordero MD  CONSULT REQUESTED BY: Anuj Javed MD      Reason for consult: Stroke       History obtained from chart review and the patient.    HPI per EMR: Heidi Fine is a 87 y.o. female with a history of hypertension who presents to the emergency room complaining lightheadedness. Patient states that she was sitting down in a chair today and started feeling very dizzy.  She said she felt like she was going to pass out and her vision was starting to dim.  She said she has gotten dizzy frequently in the past and has always attributed to allergies.  Today her vision changed with this dizziness she has difficulty describing it.  The only reason she came to the hospital today for the dizziness was because it did not seem to go away.  She said she was sitting when it occurred so it did not happen when she stood up.  She denies any numbness or tingling, lateralized weakness, speech changes, chest pain or shortness of breath, or any other symptoms.  She said she used to take a blood pressure medicine but stopped it about a year ago.  She said her doctor told her she should take it just keep her blood pressure from getting high.  She periodically checks her blood pressure either DrMykel mcelroy or or in its never elevated.  She is not aware of any heart problems or high blood pressure or diabetes in her family and said her mother lived to be in her 90s and she is unsure about father.  She had an aunt who had a stroke.  She is only had a foot surgery and some other kind of surgery that she has difficulty describing that was more than 40 years ago.  She does not not smoke, drink alcohol or use any drugs.  She takes no medicine on a regular basis except for an aspirin occasionally.      In the ED, blood pressure was elevated at 211/84.  She was given 10 mg of labetalol and blood pressure was 168/94 with a map decreased from 140 to 122.  CT head negative for anything acute with some chronic age-related changes    Neurology consult:  Patient was seen examined by me this morning.  She is alert and oriented times.  She states that yesterday she felt dizzy with room spinning sensation however no nausea or vomiting.  She feels she is intermittent dizziness and attributes to allergies (she is not able to tell me what energy).  She states she had some vision abnormality with dizziness however not able to describe it.  She denies any unilateral weakness or sensory changes.  She states she does not have any history of blood pressure and does not take any medications at home.    In the ER her blood pressure elevated improved with labetalol.  She states her dizziness is improved now.    1/30/23:  Patient was seen examined by me today.  She denies any new neuro deficits, her dizziness is resolved.  Blood pressure remains elevated.         PHYSICAL EXAM:  Patient Vitals for the past 24 hrs:   BP Temp Temp src Pulse Resp SpO2 Weight   01/30/23 0500 (!) 193/82 -- -- -- -- -- --   01/30/23 0433 (!) 223/103 98 °F (36.7 °C) Oral 73 18 95 % 77 kg (169 lb 12.1 oz)   01/30/23 0009 (!) 187/97 98 °F (36.7 °C) Oral 78 18 97 % --   01/29/23 1949 (!) 164/70 97.1 °F (36.2 °C) Oral 85 17 96 % --   01/29/23 1517 (!) 154/70 97.5 °F (36.4 °C) Oral 68 18 96 % --   01/29/23 1157 (!) 186/83 97.6 °F (36.4 °C) Oral 71 18 100 % --   01/29/23 0730 (!) 173/71 97.9 °F (36.6 °C) Oral 82 18 (!) 94 % --         GENERAL APPEARANCE: Alert, well-developed, well-nourished female in no acute distress.  HEENT: Normocephalic and atraumatic. PERRL. Oropharynx unremarkable.  PULM: Normal respiratory effort. No accessory muscle use.  CV: RRR.  ABDOMEN: Soft, nontender.  EXTREMITIES: No obvious signs of vascular compromise. Pulses present. No  cyanosis, clubbing or edema.  SKIN: Clear; no rashes, lesions or skin breaks in exposed areas.    NEURO:  MENTAL STATUS: Patient awake and oriented to time, place, and person, recent/remote memory normal, attention span/concentration normal, speech fluent without paraphasic errors, good comprehension with appropriate thought content, and fund of knowledge appropriate for patient's level of education.  Affect euthymic.    CRANIAL NERVES:  CN I: Not tested.  CN II: Fundoscopic exam deferred.  CN III, IV, VI: Pupils equal, round and reactive to light.  Extraocular movements full and intact.  CN V: Facial sensation normal.  CN VII: Facial asymmetry absent.  CN VIII: Hearing grossly normal and equal bilaterally.  No skew deviation or pathologic nystagmus.  CN IX, X: Palate elevates symmetrically. Speech/articulation is clear without dysarthria.  CN XI: Shoulder shrug and chin rotation equal with good strength.  CN XII: Tongue protrusion midline.    MOTOR:  Bulk normal. Tone normal and symmetric throughout.  Abnormal movements absent.  Tremor: none present.  Strength 5/5 throughout.    REFLEXES:  DTRs 2+ throughout.  Plantar response equivocal bilaterally.  SENSATION: grossly intact throughout.  COORDINATION: normal finger-to-nose on left side, very mild dysmetria on the right  STATION: not tested.  GAIT: not tested.      NIHSS:  1a      Level of Consciousness (alert, drowsy, etc.):   0=alert; keenly responsive  1b.     Level of Consciousness Questions (month, age): 0= able to answer both questions  1c.     Level of Consciousness Commands (open, close eyes, make fist, let go):  0=Answers both tasks correctly  2.      Best Gaze (eyes open - patient follows examiner's finger or face):      0=normal  3.      Visual (introduce visual stimulus/threat to patient's visual field quadrants):  0=No visual loss  4.      Facial Palsy (show teeth, raise eyebrows and squeeze eyes shut):        0=Normal symmetric movement  5a.     Motor  Arm - Left (elevate extremity 90 degrees and score drift/movement):       0=No drift, limb holds 90 (or 45) degrees for full 10 seconds  5b.     Motor Arm - Right (elevate extremity 90 degrees and score drift/movement):      0=No drift, limb holds 90 (or 45) degrees for full 10 seconds  6a.     Motor Leg - Left (elevate extremity 30 degrees and score drift/movement):       0=No drift, limb holds 90 (or 45) degrees for full 10 seconds  6b      Motor Leg - Right (elevate extremity 30 degrees and score drift/movement):      0=No drift, limb holds 90 (or 45) degrees for full 10 seconds  7.      Limb Ataxia (finger-nose, heel down shin):      0=Absent  8.      Sensory (pin prick to face, arm, trunk, and leg - compare side to side):        0=Normal; no sensory loss  9.      Best Language (name items, describe a picture and read sentences):      0=No aphasia, normal  10.     Dysarthria (evaluate speech clarity by patient repeating listed words): 0=Normal  11.     Extinction and Inattention (use prior testing to identify neglect or double simultaneous stimuli testing):      0=No abnormality          NIH Stroke Scale Total:         0      Labs:  Recent Labs   Lab 01/28/23 2027 01/29/23  0501   * 135*   K 4.2 3.5    102   CO2 24 25   BUN 17 14   CREATININE 1.1 1.0   GLU 88 93   CALCIUM 9.5 9.3   PHOS  --  2.5*   MG 1.9 1.9       Recent Labs   Lab 01/28/23 2027 01/29/23  0501   WBC 4.23 3.44*   HGB 13.2 13.2   HCT 41.4 41.3    227       Recent Labs   Lab 01/28/23 2027 01/29/23  0501   ALBUMIN 3.9 3.7   PROT 7.4 7.0   BILITOT 0.8 0.8   ALKPHOS 78 71   ALT 11 11   AST 23 20       Lab Results   Component Value Date    INR 1.0 01/29/2023     Lab Results   Component Value Date    TRIG 35 01/29/2023    HDL 47 01/29/2023    CHOLHDL 30.5 01/29/2023     Lab Results   Component Value Date    HGBA1C 6.0 01/29/2023     No results found for: PROTEINCSF, GLUCCSF, WBCCSF    Imaging:  I have reviewed and interpreted the  pertinent imaging and lab results.      CTA Head and Neck (xpd)  CMS MANDATED QUALITY DATA - CT RADIATION - 436    All CT scans at this facility utilize dose modulation, iterative reconstruction, and/or weight based dosing when appropriate to reduce radiation dose to as low as reasonably achievable.    CMS MANDATED QUALITY DATA - CAROTID - 195    All measurements and percent stenosis described below were determined using NASCET criteria or criteria similar to NASCET, as defined by the Society of Radiologists in Ultrasound Consensus Conference, Radiology, 2003    Reason: CVA.    TECHNIQUE: CT angiography of brain and neck with 100 mL Omnipaque 350. Maximum intensity projection coronal and sagittal reformations were obtained at a separate workstation and stored in the patient's permanent medical record.    Findings:    The MCA are patent. The left A1 segment is not identified, possibly diminutive. The BETITO are otherwise patent. The PCA are patent. Bilateral P-comm are patent. The basilar artery is patent. There are diminutive portions of the right vertebral artery which has short segments of contrast opacification. The intracranial right vertebral artery is not contrast opacified. The left vertebral artery is patent. Moderate atherosclerosis of the bilateral carotid siphons. There are large calcified plaques of the bilateral carotid bulbs, which narrows the carotid bulbs. The ICA are otherwise patent. The CCA are patent. No aneurysm or dissection observed. The dural venous sinuses and jugular veins are within normal limits.    No gross acute intracranial abnormality observed. The calvarium is intact. The orbital globes and optic nerves unremarkable. The neck soft tissues are unremarkable. The visualized lungs are unremarkable.    IMPRESSION:    1.  Left A1 segment is not identified. This could possibly reflect a diminutive artery, congenitally absent or obstructed artery.  2.  Diminutive right vertebral artery with  short segments of contrast opacification. Intracranial right vertebral artery is not contrast opacified.  3.  Atherosclerosis of the bilateral carotid bulbs.  4.  No aneurysm or dissection observed.    Electronically signed by:  Sunday Somers DO  1/29/2023 12:28 PM CST Workstation: [id number]  Echo Saline Bubble? Yes  · The left ventricle is normal in size with moderate predominantly basal   septal moderate asymmetric hypertrophy eccentric hypertrophy and   hyperdynamic systolic function.  · There is no evidence of intracardiac shunting.  · Left ventricular mid-cavity obstruction is present.  · The estimated ejection fraction is 78%.  · Normal right ventricular size with normal right ventricular systolic   function.  · Mild left atrial enlargement.  · Mild tricuspid regurgitation.  · Grade I left ventricular diastolic dysfunction.  · Normal central venous pressure (3 mmHg).  · The estimated PA systolic pressure is 38 mmHg.     MRI BRAIN WITHOUT CONTRAST  REASON: Stroke, follow up    TECHNIQUE: Multiplanar and multi sequential MRI of the brain, without IV contrast.    COMPARISON: None    FINDINGS:    Gray-white matter distinction is preserved throughout the brain parenchyma. The ventricles are midline without mass effect. There are involutional changes of the brain parenchyma with ex vacuo dilatation of the ventricles. Periventricular deep white matter FLAIR and T2 hyperintensities are consistent with changes of proximal vessel ischemic disease. There is a tiny focus of restricted diffusion in the right cerebellar hemisphere. No other areas of restricted diffusion identified. There is no mass effect or midline shift of structures No intra or extra-axial fluid collections or mass. The major vascular flow voids are within normal limits. Calvarial bone marrow signal is normal.    IMPRESSION:    1.  Small focus of restricted diffusion in the right cerebellar hemisphere, felt to reflect acute/subacute infarct. No mass  effect or midline shift of structures.  2.  Chronic and involutional changes of the brain as described.    Electronically signed by:  Sunday Somers DO  1/29/2023 11:02 AM CST Workstation: [id number]   Carotid Bilateral  CAROTID DOPPLER ULTRASOUND    CLINICAL DATA:Stroke    CMS MANDATED QUALITY DATA - CAROTID - 195    All measurements and percent stenosis described below were determined using NASCET criteria or criteria similar to NASCET, as defined by the Society of Radiologists in Ultrasound Consensus Conference, Radiology, 2003    FINDINGS: Grayscale, color, and spectral Doppler analysis was performed.    There is moderate plaque formation at both carotid bulbs, without evidence of hemodynamically significant stenosis.    Peak systolic velocity in the right ICA is 102 cm/s, with ICA/CCA  systolic ratio of 1.3.    Peak systolic velocity in the left ICA is 80 cm/s, with ICA/CCA systolic ratio of 0.8.    Antegrade flow is noted in both vertebral arteries.    IMPRESSION:    1. Moderate plaque formation at both carotid bulbs, with no Doppler evidence of hemodynamically significant stenosis.    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  Diminutive right vertebral artery with short segments of contrast opacification, intracranial right vertebral artery is not opacified which is consistent with occlusion.  Bilateral carotid atherosclerosis with no significant stenosis.  MRI brain:  Small acute infarct in the right cerebellum  ECHO:  normal LVSF EF 78%, no PFO, mild LAE  LDL: 100  HbA1c: 6.0     Plan  Admitted for further stroke workup.  Etiology of stroke- large vessel disease  Continue Aspirin 81 mg, Plavix  75 mg and Lipitor 80mg.  Dual antiplatelet therapy for 3 weeks followed by monotherapy with aspirin alone.   Since there was mild left atrial enlargement on echo, patient will need outpatient Holter monitor for 4 weeks to rule out AFib cause of embolic stroke  Permissive BP to 220 systolic for 24 hrs from symptom onset and after that normalize BP  PT OT  Speech therapy  DVT prophylaxis with chemo/SCD prophylaxis  Discussed lifestyle modifications as prophylactic measures for stroke prevention including, adequate blood pressure management, healthy diet and regular exercise.  Will follow peripherally. Please call with questions, concerns or neurological decline.       DVT prophylaxis with chemo/SCD prophylaxis  Extensively discussed lifestyle modifications as prophylactic measures for stroke prevention including smoking cessation, adequate blood pressure management, healthy diet and regular exercise.     [phone] within 3 days from discharge.     Stroke education was provided including stroke risk factors modification and any acute neurological changes including weakness, confusion, visual changes to come straight to the ER.     All questions were answered.                               Thank you kindly for including us in the care of this patient. Please do not hesitate to contact us with any questions.        38 minutes of care time has been spent evaluating with the patient. Time includes chart review not limited to diagnostic imaging, labs, and vitals, patient assessment, discussion with family and nursing, current order evaluations, and new order entries.         Rut Cordero MD  Neurology/vascular Neurology  Date of Service: 01/30/2023

## 2023-01-30 NOTE — PLAN OF CARE
01/30/23 1514   Post-Acute Status   Post-Acute Authorization Home Health   Home Health Status Pending medical clearance/testing     Order for home health noted and discussed with patient who was in agreement for cm to send referral to [facility]. [facility], will need to send discharge for start of care.    [facility] Alert: YES response from [facility] [place] (Home Health) re: Referral [id number] for patient in [facility] [id number]: Yes, willing to accept patient Thank you for the referral, we look forward to taking great care of your patient.

## 2023-01-30 NOTE — PT/OT/SLP PROGRESS
Physical Therapy Treatment    Patient Name:  Heidi Fine   MRN:  [id number]    Recommendations:     Discharge Recommendations: home health PT  Discharge Equipment Recommendations: bath bench  Barriers to discharge: None    Assessment:     Heidi Fine is a 87 y.o. female admitted with a medical diagnosis of CVA (cerebral vascular accident).  She presents with the following impairments/functional limitations: impaired functional mobility, impaired balance, impaired cardiopulmonary response to activity.    Pt found sitting EOB and has just finished OT session and agreeable to gait training.  Pt given verbal and tactile cues for posture due to significant forward flexed posture bent over at waist.  Pt unable to stand fully upright with assist due to fixed posture.  Pt would benefit from improved balance and safety with rollator or SPC but pt declines.  Continue to progress as able.    Rehab Prognosis: Fair; patient would benefit from acute skilled PT services to address these deficits and reach maximum level of function.    Recent Surgery: * No surgery found *      Plan:     During this hospitalization, patient to be seen 5 x/week to address the identified rehab impairments via gait training, therapeutic activities, therapeutic exercises and progress toward the following goals:    Plan of Care Expires:  03/05/23    Subjective     Chief Complaint: none  Patient/Family Comments/goals: go home  Pain/Comfort:  Pain Rating 1: 0/10      Objective:     Communicated with RN prior to session.  Patient found sitting edge of bed with telemetry upon PT entry to room.     General Precautions: Standard, fall  Orthopedic Precautions: N/A  Braces: N/A  Respiratory Status: Room air     Functional Mobility:  Transfers:     Sit to Stand:  contact guard assistance with no AD  Gait: x150' w/o AD but significant forward flexed posture, bent forward at hips, no excessive kyphosis noted.      AM-PAC 6 CLICK MOBILITY  Turning over in bed (including  adjusting bedclothes, sheets and blankets)?: 4  Sitting down on and standing up from a chair with arms (e.g., wheelchair, bedside commode, etc.): 3  Moving from lying on back to sitting on the side of the bed?: 3  Moving to and from a bed to a chair (including a wheelchair)?: 3  Need to walk in hospital room?: 3  Climbing 3-5 steps with a railing?: 2  Basic Mobility Total Score: 18       Treatment & Education:  Pt was educated on the following: call light use, importance of OOB activity and functional mobility to negate the negative effects of prolonged bed rest during this hospitalization, safe transfers/ambulation and discharge planning recommendations/options.     Patient left sitting edge of bed with all lines intact, call button in reach, RN notified, and son present..    GOALS:   Multidisciplinary Problems       Physical Therapy Goals          Problem: Physical Therapy    Goal Priority Disciplines Outcome Goal Variances Interventions   Physical Therapy Goal     PT, PT/OT      Description: Goals to be met by: 23     Patient will increase functional independence with mobility by performin. Sit to stand transfer with [place]  3. Bed to chair transfer with [place] using No Assistive Device  4. Gait  x 150 feet with Supervision using No Assistive Device.                          Time Tracking:     PT Received On: 23  PT Start Time: 948     PT Stop Time: 958  PT Total Time (min): 10 min     Billable Minutes: Gait Training 10    Treatment Type: Treatment  PT/PTA: PT     PTA Visit Number: 0     2023

## 2023-01-31 VITALS
RESPIRATION RATE: 18 BRPM | OXYGEN SATURATION: 96 % | TEMPERATURE: 98 F | BODY MASS INDEX: 30.71 KG/M2 | SYSTOLIC BLOOD PRESSURE: 127 MMHG | WEIGHT: 166.88 LBS | DIASTOLIC BLOOD PRESSURE: 72 MMHG | HEIGHT: 62 IN | HEART RATE: 83 BPM

## 2023-01-31 LAB
ALBUMIN SERPL BCP-MCNC: 4 G/DL (ref 3.5–5.2)
ALP SERPL-CCNC: 78 U/L (ref 55–135)
ALT SERPL W/O P-5'-P-CCNC: 13 U/L (ref 10–44)
ANION GAP SERPL CALC-SCNC: 10 MMOL/L (ref 8–16)
AST SERPL-CCNC: 21 U/L (ref 10–40)
BILIRUB SERPL-MCNC: 1.2 MG/DL (ref 0.1–1)
BUN SERPL-MCNC: 13 MG/DL (ref 8–23)
CALCIUM SERPL-MCNC: 9.4 MG/DL (ref 8.7–10.5)
CHLORIDE SERPL-SCNC: 105 MMOL/L (ref 95–110)
CO2 SERPL-SCNC: 22 MMOL/L (ref 23–29)
CREAT SERPL-MCNC: 0.9 MG/DL (ref 0.5–1.4)
ERYTHROCYTE [DISTWIDTH] IN BLOOD BY AUTOMATED COUNT: 15.9 % (ref 11.5–14.5)
EST. GFR  (NO RACE VARIABLE): >60 ML/MIN/1.73 M^2
GLUCOSE SERPL-MCNC: 83 MG/DL (ref 70–110)
HCT VFR BLD AUTO: 43 % (ref 37–48.5)
HGB BLD-MCNC: 14.2 G/DL (ref 12–16)
MAGNESIUM SERPL-MCNC: 1.9 MG/DL (ref 1.6–2.6)
MCH RBC QN AUTO: 27.8 PG (ref 27–31)
MCHC RBC AUTO-ENTMCNC: 33 G/DL (ref 32–36)
MCV RBC AUTO: 84 FL (ref 82–98)
PLATELET # BLD AUTO: 240 K/UL (ref 150–450)
PMV BLD AUTO: 10.5 FL (ref 9.2–12.9)
POTASSIUM SERPL-SCNC: 4.3 MMOL/L (ref 3.5–5.1)
PROT SERPL-MCNC: 7.4 G/DL (ref 6–8.4)
RBC # BLD AUTO: 5.1 M/UL (ref 4–5.4)
SODIUM SERPL-SCNC: 137 MMOL/L (ref 136–145)
WBC # BLD AUTO: 4.35 K/UL (ref 3.9–12.7)

## 2023-01-31 PROCEDURE — 25000003 PHARM REV CODE 250: Performed by: INTERNAL MEDICINE

## 2023-01-31 PROCEDURE — 83735 ASSAY OF MAGNESIUM: CPT | Performed by: INTERNAL MEDICINE

## 2023-01-31 PROCEDURE — 80053 COMPREHEN METABOLIC PANEL: CPT | Performed by: INTERNAL MEDICINE

## 2023-01-31 PROCEDURE — 97116 GAIT TRAINING THERAPY: CPT | Mod: CQ

## 2023-01-31 PROCEDURE — 36415 COLL VENOUS BLD VENIPUNCTURE: CPT | Performed by: INTERNAL MEDICINE

## 2023-01-31 PROCEDURE — 85027 COMPLETE CBC AUTOMATED: CPT | Performed by: INTERNAL MEDICINE

## 2023-01-31 PROCEDURE — 25000003 PHARM REV CODE 250: Performed by: NURSE PRACTITIONER

## 2023-01-31 RX ORDER — LISINOPRIL 10 MG/1
10 TABLET ORAL DAILY
Qty: 90 TABLET | Refills: 3 | Status: SHIPPED | OUTPATIENT
Start: 2023-02-01 | End: 2023-02-15

## 2023-01-31 RX ORDER — CLOPIDOGREL BISULFATE 75 MG/1
75 TABLET ORAL DAILY
Qty: 21 TABLET | Refills: 0 | Status: SHIPPED | OUTPATIENT
Start: 2023-02-01 | End: 2023-06-21

## 2023-01-31 RX ORDER — ATORVASTATIN CALCIUM 40 MG/1
40 TABLET, FILM COATED ORAL DAILY
Qty: 90 TABLET | Refills: 3 | Status: SHIPPED | OUTPATIENT
Start: 2023-01-31 | End: 2023-12-19 | Stop reason: SDUPTHER

## 2023-01-31 RX ORDER — NAPROXEN SODIUM 220 MG/1
81 TABLET, FILM COATED ORAL DAILY
Qty: 90 TABLET | Refills: 3 | Status: SHIPPED | OUTPATIENT
Start: 2023-02-01 | End: 2023-12-19 | Stop reason: SDUPTHER

## 2023-01-31 RX ORDER — AMLODIPINE BESYLATE 10 MG/1
10 TABLET ORAL DAILY
Qty: 30 TABLET | Refills: 11 | Status: SHIPPED | OUTPATIENT
Start: 2023-02-01 | End: 2023-12-19

## 2023-01-31 RX ADMIN — CLOPIDOGREL BISULFATE 75 MG: 75 TABLET, FILM COATED ORAL at 08:01

## 2023-01-31 RX ADMIN — AMLODIPINE BESYLATE 10 MG: 5 TABLET ORAL at 08:01

## 2023-01-31 RX ADMIN — ASPIRIN 81 MG CHEWABLE TABLET 81 MG: 81 TABLET CHEWABLE at 08:01

## 2023-01-31 RX ADMIN — LISINOPRIL 10 MG: 10 TABLET ORAL at 08:01

## 2023-01-31 NOTE — PROGRESS NOTES
[facility]  Progress Note    Patient name: Heidi Fine  MRN: [id number]  Admit Date: 1/28/2023   LOS: 1 day     SUBJECTIVE:     Principal problem: CVA (cerebral vascular accident)    Interval History:  Blood pressure remains uncontrolled.  I increased Norvasc to 10mg and added Lisinopril. CM consulted for HH.  I believe can be discharged on asa, plavix, lipitor and antihypertensives once bp more controlled. Will need outpatient holter monitor through PCP or Neurology and Neurology follow up.  Needs a referral to a PCP, she tells me.     Hospital Course:    87 year old female admitted with dizziness and elevated blood pressure. MRI of the brain revealed small right cerebellar hemisphere ischemic infarct.  Neurology, PT/OT/ST consulted. 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 Echo negative for PFO and no emoblic source but did show moderate asymmetric hypertrophy of the basal septum. 24 hours of permissive HTN allowed and then started on Norvasc with up titration for better blood pressure control. ASA, Plavix, Lipitor for secondary prevention. Therapy recommends HH.     She tells me she is no longer dizzy.    Scheduled Meds:   amLODIPine  10 mg Oral Daily    aspirin  81 mg Oral Daily    atorvastatin  40 mg Oral Daily    clopidogreL  75 mg Oral Daily    enoxaparin  40 mg Subcutaneous Daily    lisinopriL  10 mg Oral Daily     Continuous Infusions:  PRN Meds:acetaminophen, hydrALAZINE, labetalol, magnesium oxide, magnesium oxide, ondansetron, polyethylene glycol, potassium bicarbonate, potassium bicarbonate, potassium bicarbonate, potassium, sodium phosphates, potassium, sodium phosphates, potassium, sodium phosphates, sodium chloride 0.9%    Review of patient's allergies indicates:  No Known Allergies    Review of Systems: As per interval history    OBJECTIVE:     Vital Signs (Most Recent)  Temp: 97.4 °F (36.3 °C)  (01/30/23 1613)  Pulse: 80 (01/30/23 1613)  Resp: 20 (01/30/23 1613)  BP: (!) 175/76 (01/30/23 1613)  SpO2: 95 % (01/30/23 1613)    Vital Signs Range (Last 24H):  Temp:  [97.1 °F (36.2 °C)-98 °F (36.7 °C)]   Pulse:  [73-94]   Resp:  [17-20]   BP: (164-223)/()   SpO2:  [95 %-99 %]     I & O (Last 24H):  Intake/Output Summary (Last 24 hours) at 1/30/2023 1934  Last data filed at 1/30/2023 1612  Gross per 24 hour   Intake 700 ml   Output --   Net 700 ml         Physical Exam:    Vitals and nursing note reviewed.     Constitutional:       General: Not in acute distress.     Appearance: Well-developed.   HENT:      Head: Normocephalic and atraumatic.   Eyes:      Pupils: Pupils are equal, round, and reactive to light.   Cardiovascular:      Rate and Rhythm: Regular rhythm.   Pulmonary:      Effort: Pulmonary effort is normal.      Breath sounds: Normal breath sounds. No wheezing.   Abdominal:      General: There is no distension.      Palpations: Abdomen is soft.      Tenderness: There is no abdominal tenderness. There is no guarding or rebound.   Musculoskeletal:         General: Normal range of motion.      Cervical back: Normal range of motion.   Skin:     Findings: No rash.   Neurological:      Mental Status: Alert and oriented to person, place, and time.      Cranial Nerves: No cranial nerve deficit.      Sensory: No sensory deficit.     Laboratory:  I have reviewed all pertinent lab results within the past 24 hours.  CBC:   Recent Labs   Lab 01/29/23  0501   WBC 3.44*   RBC 4.88   HGB 13.2   HCT 41.3      MCV 85   MCH 27.0   MCHC 32.0       BMP:   Recent Labs   Lab 01/29/23  0501   GLU 93   *   K 3.5      CO2 25   BUN 14   CREATININE 1.0   CALCIUM 9.3   MG 1.9       Coagulation:   Recent Labs   Lab 01/29/23  0501   LABPROT 10.5   INR 1.0   APTT 24.4       Cardiac markers:   Recent Labs   Lab 01/29/23  0501   CPKMB 2.9       Microbiology Results (last 7 days)       ** No results found for  the last 168 hours. **            Diagnostic Results:  MRI: Reviewed  MRI brain with acute ischemic CVA right cerebellar hemisphere     ASSESSMENT/PLAN:         Active Hospital Problems    Diagnosis  POA    *CVA (cerebral vascular accident) [I63.9]  Yes    Uncontrolled hypertension [I10]  Yes    Chronic kidney disease, stage 3 [N18.30]  Yes    Dizziness and giddiness [R42]  Yes      Resolved Hospital Problems   No resolved problems to display.         Plan:     -ASA and plavix for 3 weeks followed by asa alone. Statin.    -Norvasc added and titrated up for better blood pressure control. S/p permissive HTN. Still uncontrolled HTN and thus Lisinopril ordered.   -Neuro checks  -PT/OT/ST  -Echo with no PFO or embolic source.  -Patient fairly adamant that she has no Hx of HTN.  Long discussion where we discussed that she does have this diagnosis now and will need to take medication for it. Dietician consult for information on low salt diet.  -Education on current medication and importance of taking these  -Lovenox  -Possible discharge home tomorrow if BP better controlled on the above medication with HH. CM consulted for apt with a PCP.          VTE Risk Mitigation (From admission, onward)           Ordered     enoxaparin injection 40 mg  Daily         01/29/23 0318     IP VTE HIGH RISK PATIENT  Once         01/29/23 0318     Place sequential compression device  Until discontinued         01/29/23 0318                      Department Hospital Medicine  [facility]  Anuj Javed MD  Date of service: 01/30/2023

## 2023-01-31 NOTE — NURSING
"Pt became very agitated when bed alarm was turned on. Stating "  I can get up on my own and I dont want to keep bothering you when I go to the bathroom " Explained that it was protocol for her diagnosis and it is in her best interest. Continued education to prevent falls . Bed monitor on , call light in reach throughout the shift .   "

## 2023-01-31 NOTE — PLAN OF CARE
Problem: Adult Inpatient Plan of Care  Goal: Plan of Care Review  Outcome: Met  Goal: Patient-Specific Goal (Individualized)  Outcome: Met  Goal: Absence of Hospital-Acquired Illness or Injury  Outcome: Met  Goal: Optimal Comfort and Wellbeing  Outcome: Met  Goal: Readiness for Transition of Care  Outcome: Met     Problem: Adjustment to Illness (Stroke, Ischemic/Transient Ischemic Attack)  Goal: Optimal Coping  Outcome: Met     Problem: Bowel Elimination Impaired (Stroke, Ischemic/Transient Ischemic Attack)  Goal: Effective Bowel Elimination  Outcome: Met     Problem: Cerebral Tissue Perfusion (Stroke, Ischemic/Transient Ischemic Attack)  Goal: Optimal Cerebral Tissue Perfusion  Outcome: Met     Problem: Cognitive Impairment (Stroke, Ischemic/Transient Ischemic Attack)  Goal: Optimal Cognitive Function  Outcome: Met     Problem: Communication Impairment (Stroke, Ischemic/Transient Ischemic Attack)  Goal: Improved Communication Skills  Outcome: Met     Problem: Functional Ability Impaired (Stroke, Ischemic/Transient Ischemic Attack)  Goal: Optimal Functional Ability  Outcome: Met     Problem: Respiratory Compromise (Stroke, Ischemic/Transient Ischemic Attack)  Goal: Effective Oxygenation and Ventilation  Outcome: Met     Problem: Sensorimotor Impairment (Stroke, Ischemic/Transient Ischemic Attack)  Goal: Improved Sensorimotor Function  Outcome: Met     Problem: Swallowing Impairment (Stroke, Ischemic/Transient Ischemic Attack)  Goal: Optimal Eating and Swallowing without Aspiration  Outcome: Met     Problem: Urinary Elimination Impaired (Stroke, Ischemic/Transient Ischemic Attack)  Goal: Effective Urinary Elimination  Outcome: Met     Problem: Fall Injury Risk  Goal: Absence of Fall and Fall-Related Injury  Outcome: Met

## 2023-01-31 NOTE — PLAN OF CARE
01/31/23 1255   Final Note   Assessment Type Final Discharge Note   Anticipated Discharge Disposition Home-Health   What phone number can be called within the next 1-3 days to see how you are doing after discharge? [phone]   Hospital Resources/Appts/Education Provided Appointments scheduled and added to AVS   Post-Acute Status   Post-Acute Authorization Home Health   Home Health Status Set-up Complete/Auth obtained   Discharge Delays None known at this time     [facility] and will be seen 2/1/2023.  Follow up appointment scheduled and added to AVS    Clear for discharge

## 2023-01-31 NOTE — PT/OT/SLP PROGRESS
"Physical Therapy Treatment    Patient Name:  Heidi Fine   MRN:  [id number]    Recommendations:     Discharge Recommendations: home health PT  Discharge Equipment Recommendations: bath bench  Barriers to discharge: None    Assessment:     Heidi Fine is a 87 y.o. female admitted with a medical diagnosis of CVA (cerebral vascular accident).  She presents with the following impairments/functional limitations: impaired functional mobility, impaired endurance, impaired self care skills, decreased ROM, impaired balance.  Pt agreeable to ambulate; refuses all assistive devices despite reaching for support from furniture in room.   Ambulated 120' with no AD, CGA, significantly flexed hips, and waddling gait with wise MIKY.   Will benefit from HHPT post discharge.     Rehab Prognosis: Good; patient would benefit from acute skilled PT services to address these deficits and reach maximum level of function.    Recent Surgery: * No surgery found *      Plan:     During this hospitalization, patient to be seen 5 x/week to address the identified rehab impairments via gait training, therapeutic activities, therapeutic exercises and progress toward the following goals:    Plan of Care Expires:  03/05/23    Subjective     Chief Complaint: "I don't need any help walking. Those walkers will make you fall"  Patient/Family Comments/goals: none expressed  Pain/Comfort:  Pain Rating 1: 0/10  Pain Rating Post-Intervention 1: 0/10      Objective:     Communicated with nurse prior to session.  Patient found HOB elevated with telemetry upon PT entry to room.     General Precautions: Standard, fall  Orthopedic Precautions: N/A  Braces: N/A  Respiratory Status: Room air     Functional Mobility:  Bed Mobility:     Supine to Sit: supervision  Transfers:     Sit to Stand:  supervision with no AD  Gait: 120' with no AD, CGA      AM-PAC 6 CLICK MOBILITY          Treatment & Education:  -Pt educ in benefit of AD for fall prevention and improved posture; pt " not receptive    Patient left sitting edge of bed with all lines intact, call button in reach, bed alarm off, and nurse notified..    GOALS:   Multidisciplinary Problems       Physical Therapy Goals          Problem: Physical Therapy    Goal Priority Disciplines Outcome Goal Variances Interventions   Physical Therapy Goal     PT, PT/OT      Description: Goals to be met by: 23     Patient will increase functional independence with mobility by performin. Sit to stand transfer with [place]  3. Bed to chair transfer with [place] using No Assistive Device  4. Gait  x 150 feet with Supervision using No Assistive Device.                          Time Tracking:     PT Received On: 23  PT Start Time: 1022     PT Stop Time: 1034  PT Total Time (min): 12 min     Billable Minutes: Gait Training 12    Treatment Type: Treatment  PT/PTA: PTA     PTA Visit Number: 1     2023

## 2023-01-31 NOTE — NURSING
Dc instructions given, including medications. Pt verbalizes understanding. Pt Dc'ed home via wheelchair on room air to POV with daughter. Pt tolerated well. No s/s of pain or distress noted.

## 2023-01-31 NOTE — PT/OT/SLP PROGRESS
Occupational Therapy      Patient Name:  Heidi Fine   MRN:  [id number]    Patient not seen today secondary to Other (Comment) (preparing for discharge home).     1/31/2023

## 2023-02-01 PROCEDURE — G0179 PR HOME HEALTH MD RECERTIFICATION: ICD-10-PCS | Mod: ,,, | Performed by: FAMILY MEDICINE

## 2023-02-01 PROCEDURE — G0179 MD RECERTIFICATION HHA PT: HCPCS | Mod: ,,, | Performed by: FAMILY MEDICINE

## 2023-02-01 NOTE — PT/OT/SLP DISCHARGE
Occupational Therapy Discharge Summary    Heidi Fine  MRN: [id number]   Principal Problem: CVA (cerebral vascular accident)      Patient Discharged from acute Occupational Therapy on 1/31/23.  Please refer to prior OT note dated 1/30/23 for functional status.    Assessment:      Goals partially met.    Objective:     GOALS:   Multidisciplinary Problems       Occupational Therapy Goals          Problem: Occupational Therapy    Goal Priority Disciplines Outcome Interventions   Occupational Therapy Goal     OT, PT/OT Ongoing, Progressing    Description: Goals to be met by: 2/15/2023     Patient will increase functional independence with ADLs by performing:    LE Dressing with Modified [place].  Grooming while standing at sink with Modified [place].  Toileting from toilet with Modified [place] for hygiene and clothing management.   Toilet transfer to toilet with Modified [place].                         Reasons for Discontinuation of Therapy Services  Transfer to alternate level of care.      Plan:     Patient Discharged to: Home with Home Health Service    2/1/2023

## 2023-02-03 ENCOUNTER — TELEPHONE (OUTPATIENT)
Dept: FAMILY MEDICINE | Facility: CLINIC | Age: 88
End: 2023-02-03

## 2023-02-03 ENCOUNTER — HOSPITAL ENCOUNTER (EMERGENCY)
Facility: HOSPITAL | Age: 88
Discharge: HOME | End: 2023-02-03
Attending: EMERGENCY MEDICINE
Payer: MEDICARE

## 2023-02-03 VITALS
SYSTOLIC BLOOD PRESSURE: 143 MMHG | TEMPERATURE: 98 F | DIASTOLIC BLOOD PRESSURE: 75 MMHG | BODY MASS INDEX: 27.6 KG/M2 | RESPIRATION RATE: 20 BRPM | HEART RATE: 89 BPM | OXYGEN SATURATION: 99 % | HEIGHT: 62 IN | WEIGHT: 150 LBS

## 2023-02-03 DIAGNOSIS — T78.40XA ALLERGIC REACTION, INITIAL ENCOUNTER: Primary | ICD-10-CM

## 2023-02-03 DIAGNOSIS — N17.9 AKI (ACUTE KIDNEY INJURY): ICD-10-CM

## 2023-02-03 LAB
ALBUMIN SERPL BCP-MCNC: 4 G/DL (ref 3.5–5.2)
ALP SERPL-CCNC: 76 U/L (ref 55–135)
ALT SERPL W/O P-5'-P-CCNC: 16 U/L (ref 10–44)
ANION GAP SERPL CALC-SCNC: 10 MMOL/L (ref 8–16)
AST SERPL-CCNC: 28 U/L (ref 10–40)
BASOPHILS # BLD AUTO: 0.02 K/UL (ref 0–0.2)
BASOPHILS NFR BLD: 0.5 % (ref 0–1.9)
BILIRUB SERPL-MCNC: 0.7 MG/DL (ref 0.1–1)
BUN SERPL-MCNC: 31 MG/DL (ref 8–23)
CALCIUM SERPL-MCNC: 9.3 MG/DL (ref 8.7–10.5)
CHLORIDE SERPL-SCNC: 107 MMOL/L (ref 95–110)
CO2 SERPL-SCNC: 21 MMOL/L (ref 23–29)
CREAT SERPL-MCNC: 1.8 MG/DL (ref 0.5–1.4)
DIFFERENTIAL METHOD: ABNORMAL
EOSINOPHIL # BLD AUTO: 0.1 K/UL (ref 0–0.5)
EOSINOPHIL NFR BLD: 2.2 % (ref 0–8)
ERYTHROCYTE [DISTWIDTH] IN BLOOD BY AUTOMATED COUNT: 15.4 % (ref 11.5–14.5)
EST. GFR  (NO RACE VARIABLE): 26.9 ML/MIN/1.73 M^2
GLUCOSE SERPL-MCNC: 101 MG/DL (ref 70–110)
HCT VFR BLD AUTO: 40.8 % (ref 37–48.5)
HGB BLD-MCNC: 13.5 G/DL (ref 12–16)
IMM GRANULOCYTES # BLD AUTO: 0.02 K/UL (ref 0–0.04)
IMM GRANULOCYTES NFR BLD AUTO: 0.5 % (ref 0–0.5)
LYMPHOCYTES # BLD AUTO: 1.1 K/UL (ref 1–4.8)
LYMPHOCYTES NFR BLD: 27.1 % (ref 18–48)
MCH RBC QN AUTO: 27.6 PG (ref 27–31)
MCHC RBC AUTO-ENTMCNC: 33.1 G/DL (ref 32–36)
MCV RBC AUTO: 83 FL (ref 82–98)
MONOCYTES # BLD AUTO: 0.6 K/UL (ref 0.3–1)
MONOCYTES NFR BLD: 15.4 % (ref 4–15)
NEUTROPHILS # BLD AUTO: 2.2 K/UL (ref 1.8–7.7)
NEUTROPHILS NFR BLD: 54.3 % (ref 38–73)
NRBC BLD-RTO: 0 /100 WBC
PLATELET # BLD AUTO: 252 K/UL (ref 150–450)
PMV BLD AUTO: 10.1 FL (ref 9.2–12.9)
POTASSIUM SERPL-SCNC: 4.3 MMOL/L (ref 3.5–5.1)
PROT SERPL-MCNC: 7.8 G/DL (ref 6–8.4)
RBC # BLD AUTO: 4.9 M/UL (ref 4–5.4)
SODIUM SERPL-SCNC: 138 MMOL/L (ref 136–145)
WBC # BLD AUTO: 4.02 K/UL (ref 3.9–12.7)

## 2023-02-03 PROCEDURE — 96375 TX/PRO/DX INJ NEW DRUG ADDON: CPT

## 2023-02-03 PROCEDURE — 96372 THER/PROPH/DIAG INJ SC/IM: CPT | Performed by: EMERGENCY MEDICINE

## 2023-02-03 PROCEDURE — 63600175 PHARM REV CODE 636 W HCPCS: Performed by: EMERGENCY MEDICINE

## 2023-02-03 PROCEDURE — 85025 COMPLETE CBC W/AUTO DIFF WBC: CPT | Performed by: EMERGENCY MEDICINE

## 2023-02-03 PROCEDURE — 99285 EMERGENCY DEPT VISIT HI MDM: CPT

## 2023-02-03 PROCEDURE — 80053 COMPREHEN METABOLIC PANEL: CPT | Performed by: EMERGENCY MEDICINE

## 2023-02-03 PROCEDURE — 96361 HYDRATE IV INFUSION ADD-ON: CPT

## 2023-02-03 PROCEDURE — 25000003 PHARM REV CODE 250: Performed by: EMERGENCY MEDICINE

## 2023-02-03 PROCEDURE — 96374 THER/PROPH/DIAG INJ IV PUSH: CPT

## 2023-02-03 RX ORDER — SODIUM CHLORIDE 9 MG/ML
1000 INJECTION, SOLUTION INTRAVENOUS
Status: COMPLETED | OUTPATIENT
Start: 2023-02-03 | End: 2023-02-03

## 2023-02-03 RX ORDER — METHYLPREDNISOLONE SOD SUCC 125 MG
125 VIAL (EA) INJECTION
Status: COMPLETED | OUTPATIENT
Start: 2023-02-03 | End: 2023-02-03

## 2023-02-03 RX ORDER — DIPHENHYDRAMINE HCL 25 MG
25 CAPSULE ORAL
Status: COMPLETED | OUTPATIENT
Start: 2023-02-03 | End: 2023-02-03

## 2023-02-03 RX ORDER — PREDNISONE 20 MG/1
40 TABLET ORAL DAILY
Qty: 10 TABLET | Refills: 0 | Status: SHIPPED | OUTPATIENT
Start: 2023-02-03 | End: 2023-02-08

## 2023-02-03 RX ORDER — FAMOTIDINE 10 MG/ML
20 INJECTION INTRAVENOUS
Status: COMPLETED | OUTPATIENT
Start: 2023-02-03 | End: 2023-02-03

## 2023-02-03 RX ORDER — EPINEPHRINE 0.3 MG/.3ML
0.3 INJECTION SUBCUTANEOUS
Status: COMPLETED | OUTPATIENT
Start: 2023-02-03 | End: 2023-02-03

## 2023-02-03 RX ADMIN — DIPHENHYDRAMINE HYDROCHLORIDE 25 MG: 25 CAPSULE ORAL at 11:02

## 2023-02-03 RX ADMIN — SODIUM CHLORIDE 1000 ML: 0.9 INJECTION, SOLUTION INTRAVENOUS at 12:02

## 2023-02-03 RX ADMIN — EPINEPHRINE 0.3 MG: 0.3 INJECTION INTRAMUSCULAR at 11:02

## 2023-02-03 RX ADMIN — FAMOTIDINE 20 MG: 10 INJECTION, SOLUTION INTRAVENOUS at 11:02

## 2023-02-03 RX ADMIN — METHYLPREDNISOLONE SODIUM SUCCINATE 125 MG: 125 INJECTION, POWDER, FOR SOLUTION INTRAMUSCULAR; INTRAVENOUS at 11:02

## 2023-02-03 NOTE — ED PROVIDER NOTES
Encounter Date: 2/3/2023       History     Chief Complaint   Patient presents with    Allergic Reaction     Facial swelling with waking this morning. Pt takes lisinopril. No SOB, dysphagia.      Patient presents complaining of swelling to the lower lip and face.  Patient does take lisinopril.  Patient started with this morning.  No difficulty breathing.  At the worst symptoms are moderate.    Review of patient's allergies indicates:  No Known Allergies  Past Medical History:   Diagnosis Date    Allergies     Essential (primary) hypertension      Past Surgical History:   Procedure Laterality Date    FOOT SURGERY       Family History   Problem Relation Age of Onset    No Known Problems Mother     Stroke Maternal Aunt     Cancer Neg Hx     Diabetes Neg Hx     Heart disease Neg Hx     Hypertension Neg Hx      Social History     Tobacco Use    Smoking status: Never    Smokeless tobacco: Never   Substance Use Topics    Alcohol use: Never    Drug use: Never     Review of Systems   All other systems reviewed and are negative.    Physical Exam     Initial Vitals [02/03/23 1050]   BP Pulse Resp Temp SpO2   (!) 143/75 97 17 98.4 °F (36.9 °C) 98 %      MAP       --         Physical Exam    Nursing note and vitals reviewed.  Constitutional: She appears well-developed and well-nourished.   Pleasant, polite   HENT:   Head: Normocephalic and atraumatic.   The left lower lip is edematous.  There is swelling into the cheek.  The oropharynx is clear.   Eyes: EOM are normal.   Neck: Neck supple.   Normal range of motion.  Cardiovascular:  Normal rate, regular rhythm, normal heart sounds and intact distal pulses.           Pulmonary/Chest: Breath sounds normal. No respiratory distress.   Abdominal: Abdomen is soft.   Musculoskeletal:      Cervical back: Normal range of motion and neck supple.     Neurological: She is alert and oriented to person, place, and time.   Skin: Skin is warm and dry. Capillary refill takes less than 2 seconds.    Psychiatric: She has a normal mood and affect. Her behavior is normal. Judgment and thought content normal.       ED Course   Procedures  Labs Reviewed   CBC W/ AUTO DIFFERENTIAL - Abnormal; Notable for the following components:       Result Value    RDW 15.4 (*)     Mono % 15.4 (*)     All other components within normal limits   COMPREHENSIVE METABOLIC PANEL - Abnormal; Notable for the following components:    CO2 21 (*)     BUN 31 (*)     Creatinine 1.8 (*)     eGFR 26.9 (*)     All other components within normal limits          Imaging Results    None          Medications   diphenhydrAMINE capsule 25 mg (25 mg Oral Given 2/3/23 1142)   methylPREDNISolone sodium succinate injection 125 mg (125 mg Intravenous Given 2/3/23 1142)   famotidine (PF) injection 20 mg (20 mg Intravenous Given 2/3/23 1140)   EPINEPHrine (EPIPEN) 0.3 mg/0.3 mL pen injection 0.3 mg (0.3 mg Intramuscular Given 2/3/23 1145)   0.9%  NaCl infusion (1,000 mLs Intravenous New Bag 2/3/23 1238)     Medical Decision Making:   Initial Assessment:   No apparent distress  Differential Diagnosis:   Angioedema, allergic reaction acute kidney injury, dehydration  Clinical Tests:   Lab Tests: Reviewed and Ordered  Radiological Study: Ordered and Reviewed  Medical Tests: Reviewed and Ordered  ED Management:  [facility]    Patient presents for emergent evaluation of acute allergic reaction that poses a threat to life and/or bodily function.    In the ED patient found to have acute angioedema.    I ordered labs and personally reviewed them.  Labs significant for acute kidney injury creatinine of 1.8.      Discharge MDM    Patient was managed in the ED with IV Solu-Medrol, Pepcid, Benadryl, epinephrine, fluid.    The response to treatment was improved.  The lip swelling has nearly completely resolved..  Patient was advised to follow-up with allergist and was advised to stop lisinopril and we will see her primary care doctor this week.  Patient was also advised to  repeat kidney function test.  Patient was discharged in stable condition.  Detailed return precautions discussed.    Attending Critical Care:   Critical Care Times:   Direct Patient Care (initial evaluation, reassessments, and time considering the case)................................................................10 minutes.   Additional History from reviewing old medical records or taking additional history from the family, EMS, PCP, etc.......................10 minutes.   Ordering, Reviewing, and Interpreting Diagnostic Studies...............................................................................................................10 minutes.   Documentation..................................................................................................................................................................................5 minutes.   ==============================================================  · Total Critical Care Time - exclusive of procedural time: 35 minutes.  ==============================================================  Critical care was necessary to treat or prevent imminent or life-threatening deterioration of the following conditions:  Acute allergic reaction, angioedema.   Critical care was time spent personally by me on the following activities: obtaining history from patient or relative, examination of patient, review of x-rays / CT sent with the patient, ordering lab, x-rays, and/or EKG, development of treatment plan with patient or relative, ordering and performing treatments and interventions, interpretation of cardiac measurements and re-evaluation of patient's conition.   Critical Care Condition: potentially life-threatening                         Clinical Impression:   Final diagnoses:  [T78.40XA] Allergic reaction, initial encounter (Primary)  [N17.9] [place] (acute kidney injury)        ED Disposition Condition    Discharge Stable          ED Prescriptions       Medication  Sig Dispense Start Date End Date Auth. Provider    predniSONE (DELTASONE) 20 MG tablet Take 2 tablets (40 mg total) by mouth once daily. for 5 days 10 tablet 2/3/2023 2/8/2023 Shantanu Cuadra MD          Follow-up Information       Follow up With Specialties Details Why Contact Info    Angela Rea MD Family Medicine In 3 days  [address] [place]  [address]  [facility] [address]  [phone]               Shantanu Cuadra MD  02/03/23 5983

## 2023-02-03 NOTE — TELEPHONE ENCOUNTER
Home Health Nurse Lilia calling stating she received call from patients son stating patients cheeks and lips are swollen this morning.  Has not taken any  benadryl at this time.  States recent discharge from [facility], did not take any medications prior to hospital stay.  Believes may be medication related.  Has HFU next week with dr cardona.  Please advise -DN    PRINTED

## 2023-02-10 ENCOUNTER — TELEPHONE (OUTPATIENT)
Dept: ADMINISTRATIVE | Facility: OTHER | Age: 88
End: 2023-02-10
Payer: MEDICARE

## 2023-02-14 NOTE — DISCHARGE SUMMARY
[facility]  Discharge Summary  Patient Name: Heidi Fine MRN: [id number]   Patient Class: IP- Inpatient  Length of Stay: 2   Admission Date: 1/28/2023  4:57 PM Attending Physician: Eliceo Jay MD   Primary Care Provider: Angela Rea MD Face-to-Face encounter date: 1/31/23   Chief Complaint: Dizziness (Started today. )    Date of Discharge: 1/31/23  Discharge Disposition:Home-Health Care [facility]   Condition: Stable       Reason for Hospitalization     Active Hospital Problems    Diagnosis    *CVA (cerebral vascular accident)    Uncontrolled hypertension    Chronic kidney disease, stage 3    Dizziness and giddiness         Brief History of Present Illness    Heidi Fine is a 87 y.o.  female who  has a past medical history of Allergies and Essential (primary) hypertension.. The patient presented to [facility] on 1/28/2023 with a primary complaint of Dizziness (Started today. )    HPI per EMR: Heidi Fine is a 87 y.o. female with a history of hypertension who presents to the emergency room complaining lightheadedness. Patient states that she was sitting down in a chair today and started feeling very dizzy.  She said she felt like she was going to pass out and her vision was starting to dim.  She said she has gotten dizzy frequently in the past and has always attributed to allergies.  Today her vision changed with this dizziness she has difficulty describing it.  The only reason she came to the hospital today for the dizziness was because it did not seem to go away.  She said she was sitting when it occurred so it did not happen when she stood up.  She denies any numbness or tingling, lateralized weakness, speech changes, chest pain or shortness of breath, or any other symptoms.  She said she used to take a blood pressure medicine but stopped it about a year ago.  She said her doctor told her she should take it just keep her blood pressure from getting high.  She periodically checks  her blood pressure either Dr. mcelroy or or in its never elevated.  She is not aware of any heart problems or high blood pressure or diabetes in her family and said her mother lived to be in her 90s and she is unsure about father.  She had an aunt who had a stroke.  She is only had a foot surgery and some other kind of surgery that she has difficulty describing that was more than 40 years ago.  She does not not smoke, drink alcohol or use any drugs.  She takes no medicine on a regular basis except for an aspirin occasionally.     In the ED, blood pressure was elevated at 211/84.  She was given 10 mg of labetalol and blood pressure was 168/94 with a map decreased from 140 to 122.  CT head negative for anything acute with some chronic age-related changes     Neurology consult:    She is alert and oriented times.  She states that yesterday she felt dizzy with room spinning sensation however no nausea or vomiting.  She feels she is intermittent dizziness and attributes to allergies (she is not able to tell me what energy).  She states she had some vision abnormality with dizziness however not able to describe it.  She denies any unilateral weakness or sensory changes.  She states she does not have any history of blood pressure and does not take any medications at home.    For the full HPI please refer to the History & Physical from this admission.    Hospital Course By Problem with Pertinent Findings       Dizziness   -ASA and plavix for 3 weeks followed by asa alone. Statin.    -Norvasc added and titrated up for better blood pressure control. S/p permissive HTN. Still uncontrolled HTN, Lisinopril ordered.   -Neuro checks  -PT/OT/ST  -Echo with no PFO or embolic source.  -Patient fairly adamant that she has no Hx of HTN.  Long discussion where we discussed that she does have this diagnosis now and will need to take medication for it. Dietician consult for information on low salt diet.  -Education on current medication  "and importance of taking these  -Lovenox  - BP better controlled on the above medication. CM consulted for apt with a PCP and     Neuro Plan  Admitted for further stroke workup.  Etiology of stroke- large vessel disease  Continue Aspirin 81 mg, Plavix 75 mg and Lipitor 80mg.  Dual antiplatelet therapy for 3 weeks followed by monotherapy with aspirin alone.   Since there was mild left atrial enlargement on echo, patient will need outpatient Holter monitor for 4 weeks to rule out AFib cause of embolic stroke  Permissive BP to 220 systolic for 24 hrs from symptom onset and after that normalize BP  PT OT  Speech therapy  DVT prophylaxis with chemo/SCD prophylaxis  Discussed lifestyle modifications as prophylactic measures for stroke prevention including, adequate blood pressure management, healthy diet and regular exercise.  Will follow peripherally. Please call with questions, concerns or neurological decline.        DVT prophylaxis with chemo/SCD prophylaxis  Extensively discussed lifestyle modifications as prophylactic measures for stroke prevention including smoking cessation, adequate blood pressure management, healthy diet and regular exercise.     [phone] 3 days from discharge.     Stroke education was provided including stroke risk factors modification and any acute neurological changes including weakness, confusion, visual changes to come straight to the ER.     All questions were answered.                              Patient was seen and examined on the date of discharge and determined to be suitable for discharge.    Physical Exam  /72 (BP Location: Right arm, Patient Position: Lying)   Pulse 83   Temp 97.9 °F (36.6 °C) (Axillary)   Resp 18   Ht 5' 2" (1.575 m)   Wt 75.7 kg (166 lb 14.2 oz)   SpO2 96%   BMI 30.52 kg/m²   Vitals reviewed.    Constitutional: No distress.   HENT: Atraumatic.   Cardiovascular: Normal rate, regular " rhythm.  S1 S2.    Pulmonary/Chest: Effort normal. Clear to auscultation bilaterally. No wheezes.   Abdominal: Soft. Bowel sounds are normal. Exhibits no distension and no mass. No tenderness  Neurological: Alert.   Skin: Skin is warm and dry.     Following labs were Reviewed   No results for input(s): WBC, HGB, HCT, PLT, GLUCOSE, CALCIUM, ALBUMIN, PROT, NA, K, CO2, CL, BUN, CREATININE, ALKPHOS, ALT, AST, BILITOT in the last 24 hours.  No results found for: POCTGLUCOSE     All labs within the past 24 hours have been reviewed    Microbiology Results (last 7 days)       ** No results found for the last 168 hours. **          CTA Head and Neck (xpd)   Final Result      MRI BRAIN WITHOUT CONTRAST   Final Result      US Carotid Bilateral   Final Result      CT Head Without Contrast   Final Result          No results found for this or any previous visit.      Consultants and Procedures   Consultants:  Consults (From admission, onward)          Status Ordering Provider     Inpatient consult to Social Work/Case Management  Once        Provider:  (Not yet assigned)    Completed LINDSEY NAVA     Inpatient consult to Social Work/Case Management  Once        Provider:  (Not yet assigned)    Completed LINDSEY NAVA.     IP consult to case management/social work  Once        Provider:  (Not yet assigned)    Completed SANA TRACY     Inpatient consult to Neurology  Once        Provider:  Mayco Galeano MD    Completed SANA TRACY            Procedures:   * No surgery found *     Discharge Information:   Diet:  Resume Cardiac diet/Diabetic Diet    Physical Activity:  Activity as tolerated    Instructions:  1. Take all medications as prescribed  2. Keep all follow-up appointments  3. Return to the hospital or call your primary care physicians if any worsening symptoms such as chest pain, shortness of breath, bleeding,  intractable pain, fever >101 occur.      Follow-Up Appointments:  Please call your primary  care physician to schedule an appointment in 1 week time.     Contact information for follow-up providers       Angela Rea MD Follow up on 2/15/2023.    Specialty: Family Medicine  Why: Hospital follow up, 1140am  Contact information:  [address]  [address]  [place] [address]  [phone]               Rut Cordero MD Follow up in 3 day(s).    Specialty: Neurology  Why: please schedule  Contact information:  [address]  [place] [address]  [phone]                       Contact information for after-discharge care       Home Medical Care       DARIUS HICKMAN .    Service: Home Nursing  Contact information:  [address], [address]  [facility] [address]  [phone]                                     Pending laboratory work/Tests to be performed/followed by the Primary care Physician:    The patient was discharged with discharge instructions reviewed in written and verbal form. All questions were answered and prescriptions were provided. The importance of making follow up appointments and compliance of medications has been emphasized. The patient will follow up in 1 week or sooner as needed with the PCP. Tthe patient understands and agrees with the plan. Upon discharge, patient needs to be on following medications.    Discharge Medications:     Medication List        START taking these medications      amLODIPine 10 MG tablet  Commonly known as: NORVASC  Take 1 tablet (10 mg total) by mouth once daily.     aspirin 81 MG Chew  Take 1 tablet (81 mg total) by mouth once daily.     atorvastatin 40 MG tablet  Commonly known as: LIPITOR  Take 1 tablet (40 mg total) by mouth once daily.     clopidogreL 75 mg tablet  Commonly known as: PLAVIX  Take 1 tablet (75 mg total) by mouth once daily. for 21 days     lisinopriL 10 MG tablet  Take 1 tablet (10 mg total) by mouth once daily.               Where to Get Your Medications        These medications were sent to [facility]  [address] [address] [id number]      Hours: Mon-Fri, 8a-5:30p Phone: [phone]   amLODIPine 10 MG tablet  aspirin 81 MG Chew  atorvastatin 40 MG tablet  clopidogreL 75 mg tablet  lisinopriL 10 MG tablet           I spent 30 minutes preparing the discharge for this patient including reviewing records from previous encounters, preparation of discharge summary, assessing and final examination of the patient, discharge medicine reconciliation, discussing plan of care, follow up and education and prescriptions.       Eliceo Jay  [facility] Hospitalist

## 2023-02-15 ENCOUNTER — TELEPHONE (OUTPATIENT)
Dept: FAMILY MEDICINE | Facility: CLINIC | Age: 88
End: 2023-02-15

## 2023-02-15 ENCOUNTER — OFFICE VISIT (OUTPATIENT)
Dept: FAMILY MEDICINE | Facility: CLINIC | Age: 88
End: 2023-02-15
Payer: MEDICARE

## 2023-02-15 VITALS
DIASTOLIC BLOOD PRESSURE: 60 MMHG | BODY MASS INDEX: 32.37 KG/M2 | HEIGHT: 58 IN | OXYGEN SATURATION: 98 % | WEIGHT: 154.19 LBS | HEART RATE: 94 BPM | SYSTOLIC BLOOD PRESSURE: 120 MMHG

## 2023-02-15 DIAGNOSIS — I65.23 ARTERIOSCLEROSIS OF CAROTID ARTERY, BILATERAL: ICD-10-CM

## 2023-02-15 DIAGNOSIS — Z76.89 ENCOUNTER TO ESTABLISH CARE WITH NEW DOCTOR: ICD-10-CM

## 2023-02-15 DIAGNOSIS — T46.4X5A ANGIOEDEMA DUE TO ANGIOTENSIN CONVERTING ENZYME INHIBITOR (ACE-I): ICD-10-CM

## 2023-02-15 DIAGNOSIS — N17.9 AKI (ACUTE KIDNEY INJURY): ICD-10-CM

## 2023-02-15 DIAGNOSIS — I63.20 CEREBROVASCULAR ACCIDENT (CVA) DUE TO OCCLUSION OF PRECEREBRAL ARTERY: Primary | ICD-10-CM

## 2023-02-15 DIAGNOSIS — T78.3XXA ANGIOEDEMA DUE TO ANGIOTENSIN CONVERTING ENZYME INHIBITOR (ACE-I): ICD-10-CM

## 2023-02-15 PROCEDURE — 99214 PR OFFICE/OUTPT VISIT, EST, LEVL IV, 30-39 MIN: ICD-10-PCS | Mod: S$GLB,,, | Performed by: FAMILY MEDICINE

## 2023-02-15 PROCEDURE — 99214 OFFICE O/P EST MOD 30 MIN: CPT | Mod: S$GLB,,, | Performed by: FAMILY MEDICINE

## 2023-02-15 NOTE — TELEPHONE ENCOUNTER
----- Message from Roz Tavarez sent at 2/15/2023  1:03 PM CST -----  This patient saw Dr. Rea today. She needs another appointment in 3 months mid  morning. Please call her son Edy # [phone] GH

## 2023-02-15 NOTE — PROGRESS NOTES
SUBJECTIVE:    Patient ID: Heidi Fine is a 87 y.o. female.    Chief Complaint: Establish Care (Establishing care and HFU from [facility]/ )    Patient presents with her son for hospital follow-up and establish care.  She had not had a previous diagnosis of hypertension.  She has been visiting her son and taken to the ER secondary to acute stroke from the elevated blood pressures.  This was 01/28/2023.  She was placed on ACE-inhibitor for her hypertension.  Unfortunately a few days later she presented back to the emergency room secondary to angioedema.  Also was found to have acute kidney injury.  The patient was hydrated and medications were adjusted.    She reports that she feels better with no issues.  She has not yet been re-evaluated by Neurology as an outpatient.  Blood pressure is well-controlled with amlodipine. She is currently receiving home health care.         Admit Date: 2/3/23   Discharge Date: 2/3/23  Discharge Facility: Hospital    Medication Reconciliation:  Medications changed/added/deleted. lisinopril  New Prescriptions filled after discharge: not applicable  Discharge summary reviewed:  yes  Pending test results at discharge reviewed:   no  Follow up appointments scheduled:  no             neurology  Follow up labs/tests ordered:   yes  Home Health ordered on discharge:   yes  Home Health company name: [facility]  DME ordered at discharge:   not applicable  How patient is feeling since discharge from the hospital?  better     Patient follow up phone call documented on separate encounter.      Admission on 02/03/2023, Discharged on 02/03/2023   Component Date Value Ref Range Status    WBC 02/03/2023 4.02  3.90 - 12.70 K/uL Final    RBC 02/03/2023 4.90  4.00 - 5.40 M/uL Final    Hemoglobin 02/03/2023 13.5  12.0 - 16.0 g/dL Final    Hematocrit 02/03/2023 40.8  37.0 - 48.5 % Final    MCV 02/03/2023 83  82 - 98 fL Final    MCH 02/03/2023 27.6  27.0 - 31.0 pg Final    MCHC 02/03/2023 33.1  32.0  - 36.0 g/dL Final    RDW 02/03/2023 15.4 (H)  11.5 - 14.5 % Final    Platelets 02/03/2023 252  150 - 450 K/uL Final    MPV 02/03/2023 10.1  9.2 - 12.9 fL Final    Immature Granulocytes 02/03/2023 0.5  0.0 - 0.5 % Final    Gran # (ANC) 02/03/2023 2.2  1.8 - 7.7 K/uL Final    Immature Grans (Abs) 02/03/2023 0.02  0.00 - 0.04 K/uL Final    Lymph # 02/03/2023 1.1  1.0 - 4.8 K/uL Final    Mono # 02/03/2023 0.6  0.3 - 1.0 K/uL Final    Eos # 02/03/2023 0.1  0.0 - 0.5 K/uL Final    Baso # 02/03/2023 0.02  0.00 - 0.20 K/uL Final    nRBC 02/03/2023 0  0 /100 WBC Final    Gran % 02/03/2023 54.3  38.0 - 73.0 % Final    Lymph % 02/03/2023 27.1  18.0 - 48.0 % Final    Mono % 02/03/2023 15.4 (H)  4.0 - 15.0 % Final    Eosinophil % 02/03/2023 2.2  0.0 - 8.0 % Final    Basophil % 02/03/2023 0.5  0.0 - 1.9 % Final    Differential Method 02/03/2023 Automated   Final    Sodium 02/03/2023 138  136 - 145 mmol/L Final    Potassium 02/03/2023 4.3  3.5 - 5.1 mmol/L Final    Chloride 02/03/2023 107  95 - 110 mmol/L Final    CO2 02/03/2023 21 (L)  23 - 29 mmol/L Final    Glucose 02/03/2023 101  70 - 110 mg/dL Final    BUN 02/03/2023 31 (H)  8 - 23 mg/dL Final    Creatinine 02/03/2023 1.8 (H)  0.5 - 1.4 mg/dL Final    Calcium 02/03/2023 9.3  8.7 - 10.5 mg/dL Final    Total Protein 02/03/2023 7.8  6.0 - 8.4 g/dL Final    Albumin 02/03/2023 4.0  3.5 - 5.2 g/dL Final    Total Bilirubin 02/03/2023 0.7  0.1 - 1.0 mg/dL Final    Alkaline Phosphatase 02/03/2023 76  55 - 135 U/L Final    AST 02/03/2023 28  10 - 40 U/L Final    ALT 02/03/2023 16  10 - 44 U/L Final    Anion Gap 02/03/2023 10  8 - 16 mmol/L Final    eGFR 02/03/2023 26.9 (A)  >60 mL/min/1.73 m^2 Final   Admission on 01/28/2023, Discharged on 01/31/2023   Component Date Value Ref Range Status    WBC 01/28/2023 4.23  3.90 - 12.70 K/uL Final    RBC 01/28/2023 4.82  4.00 - 5.40 M/uL Final    Hemoglobin 01/28/2023 13.2  12.0 - 16.0 g/dL Final    Hematocrit 01/28/2023 41.4  37.0 - 48.5 % Final     MCV 01/28/2023 86  82 - 98 fL Final    MCH 01/28/2023 27.4  27.0 - 31.0 pg Final    MCHC 01/28/2023 31.9 (L)  32.0 - 36.0 g/dL Final    RDW 01/28/2023 15.9 (H)  11.5 - 14.5 % Final    Platelets 01/28/2023 243  150 - 450 K/uL Final    MPV 01/28/2023 10.8  9.2 - 12.9 fL Final    Immature Granulocytes 01/28/2023 0.2  0.0 - 0.5 % Final    Gran # (ANC) 01/28/2023 2.6  1.8 - 7.7 K/uL Final    Immature Grans (Abs) 01/28/2023 0.01  0.00 - 0.04 K/uL Final    Lymph # 01/28/2023 1.0  1.0 - 4.8 K/uL Final    Mono # 01/28/2023 0.5  0.3 - 1.0 K/uL Final    Eos # 01/28/2023 0.2  0.0 - 0.5 K/uL Final    Baso # 01/28/2023 0.02  0.00 - 0.20 K/uL Final    nRBC 01/28/2023 0  0 /100 WBC Final    Gran % 01/28/2023 60.8  38.0 - 73.0 % Final    Lymph % 01/28/2023 23.4  18.0 - 48.0 % Final    Mono % 01/28/2023 11.1  4.0 - 15.0 % Final    Eosinophil % 01/28/2023 4.0  0.0 - 8.0 % Final    Basophil % 01/28/2023 0.5  0.0 - 1.9 % Final    Differential Method 01/28/2023 Automated   Final    Sodium 01/28/2023 135 (L)  136 - 145 mmol/L Final    Potassium 01/28/2023 4.2  3.5 - 5.1 mmol/L Final    Chloride 01/28/2023 102  95 - 110 mmol/L Final    CO2 01/28/2023 24  23 - 29 mmol/L Final    Glucose 01/28/2023 88  70 - 110 mg/dL Final    BUN 01/28/2023 17  8 - 23 mg/dL Final    Creatinine 01/28/2023 1.1  0.5 - 1.4 mg/dL Final    Calcium 01/28/2023 9.5  8.7 - 10.5 mg/dL Final    Total Protein 01/28/2023 7.4  6.0 - 8.4 g/dL Final    Albumin 01/28/2023 3.9  3.5 - 5.2 g/dL Final    Total Bilirubin 01/28/2023 0.8  0.1 - 1.0 mg/dL Final    Alkaline Phosphatase 01/28/2023 78  55 - 135 U/L Final    AST 01/28/2023 23  10 - 40 U/L Final    ALT 01/28/2023 11  10 - 44 U/L Final    Anion Gap 01/28/2023 9  8 - 16 mmol/L Final    eGFR 01/28/2023 48.6 (A)  >60 mL/min/1.73 m^2 Final    Troponin I High Sensitivity 01/28/2023 8.4  0.0 - 14.9 pg/mL Final    Magnesium 01/28/2023 1.9  1.6 - 2.6 mg/dL Final    Specimen UA 01/28/2023 Urine, Clean Catch   Final    Color, UA  01/28/2023 Yellow  Yellow, Straw, Malou Final    Appearance, UA 01/28/2023 Clear  Clear Final    pH, UA 01/28/2023 6.0  5.0 - 8.0 Final    Specific Gravity, UA 01/28/2023 1.010  1.005 - 1.030 Final    Protein, UA 01/28/2023 Negative  Negative Final    Glucose, UA 01/28/2023 Negative  Negative Final    Ketones, UA 01/28/2023 Negative  Negative Final    Bilirubin (UA) 01/28/2023 Negative  Negative Final    Occult Blood UA 01/28/2023 Negative  Negative Final    Nitrite, UA 01/28/2023 Negative  Negative Final    Urobilinogen, UA 01/28/2023 Negative  Negative EU/dL Final    Leukocytes, UA 01/28/2023 2+ (A)  Negative Final    RBC, UA 01/28/2023 3  0 - 4 /hpf Final    WBC, UA 01/28/2023 9 (H)  0 - 5 /hpf Final    Bacteria 01/28/2023 Negative  None-Occ /hpf Final    Squam Epithel, UA 01/28/2023 3  /hpf Final    Hyaline Casts, UA 01/28/2023 3 (A)  0-1/lpf /lpf Final    Microscopic Comment 01/28/2023 SEE COMMENT   Final    Sodium 01/29/2023 135 (L)  136 - 145 mmol/L Final    Potassium 01/29/2023 3.5  3.5 - 5.1 mmol/L Final    Chloride 01/29/2023 102  95 - 110 mmol/L Final    CO2 01/29/2023 25  23 - 29 mmol/L Final    Glucose 01/29/2023 93  70 - 110 mg/dL Final    BUN 01/29/2023 14  8 - 23 mg/dL Final    Creatinine 01/29/2023 1.0  0.5 - 1.4 mg/dL Final    Calcium 01/29/2023 9.3  8.7 - 10.5 mg/dL Final    Total Protein 01/29/2023 7.0  6.0 - 8.4 g/dL Final    Albumin 01/29/2023 3.7  3.5 - 5.2 g/dL Final    Total Bilirubin 01/29/2023 0.8  0.1 - 1.0 mg/dL Final    Alkaline Phosphatase 01/29/2023 71  55 - 135 U/L Final    AST 01/29/2023 20  10 - 40 U/L Final    ALT 01/29/2023 11  10 - 44 U/L Final    Anion Gap 01/29/2023 8  8 - 16 mmol/L Final    eGFR 01/29/2023 54.5 (A)  >60 mL/min/1.73 m^2 Final    Magnesium 01/29/2023 1.9  1.6 - 2.6 mg/dL Final    Phosphorus 01/29/2023 2.5 (L)  2.7 - 4.5 mg/dL Final    Cholesterol 01/29/2023 154  120 - 199 mg/dL Final    Triglycerides 01/29/2023 35  30 - 150 mg/dL Final    HDL 01/29/2023 47  40  - 75 mg/dL Final    LDL Cholesterol 01/29/2023 100.0  63.0 - 159.0 mg/dL Final    HDL/Cholesterol Ratio 01/29/2023 30.5  20.0 - 50.0 % Final    Total Cholesterol/HDL Ratio 01/29/2023 3.3  2.0 - 5.0 Final    Non-HDL Cholesterol 01/29/2023 107  mg/dL Final    Hemoglobin A1C 01/29/2023 6.0  4.5 - 6.2 % Final    Estimated Avg Glucose 01/29/2023 126  68 - 131 mg/dL Final    TSH 01/29/2023 4.070  0.340 - 5.600 uIU/mL Final    WBC 01/29/2023 3.44 (L)  3.90 - 12.70 K/uL Final    RBC 01/29/2023 4.88  4.00 - 5.40 M/uL Final    Hemoglobin 01/29/2023 13.2  12.0 - 16.0 g/dL Final    Hematocrit 01/29/2023 41.3  37.0 - 48.5 % Final    MCV 01/29/2023 85  82 - 98 fL Final    MCH 01/29/2023 27.0  27.0 - 31.0 pg Final    MCHC 01/29/2023 32.0  32.0 - 36.0 g/dL Final    RDW 01/29/2023 15.7 (H)  11.5 - 14.5 % Final    Platelets 01/29/2023 227  150 - 450 K/uL Final    MPV 01/29/2023 9.9  9.2 - 12.9 fL Final    Immature Granulocytes 01/29/2023 0.3  0.0 - 0.5 % Final    Gran # (ANC) 01/29/2023 1.5 (L)  1.8 - 7.7 K/uL Final    Immature Grans (Abs) 01/29/2023 0.01  0.00 - 0.04 K/uL Final    Lymph # 01/29/2023 1.3  1.0 - 4.8 K/uL Final    Mono # 01/29/2023 0.5  0.3 - 1.0 K/uL Final    Eos # 01/29/2023 0.1  0.0 - 0.5 K/uL Final    Baso # 01/29/2023 0.02  0.00 - 0.20 K/uL Final    nRBC 01/29/2023 0  0 /100 WBC Final    Gran % 01/29/2023 43.9  38.0 - 73.0 % Final    Lymph % 01/29/2023 36.9  18.0 - 48.0 % Final    Mono % 01/29/2023 14.2  4.0 - 15.0 % Final    Eosinophil % 01/29/2023 4.1  0.0 - 8.0 % Final    Basophil % 01/29/2023 0.6  0.0 - 1.9 % Final    Differential Method 01/29/2023 Automated   Final    Troponin I High Sensitivity 01/29/2023 10.9  0.0 - 14.9 pg/mL Final    CPK MB 01/29/2023 2.9  0.1 - 6.5 ng/mL Final    aPTT 01/29/2023 24.4  21.0 - 32.0 sec Final    Prothrombin Time 01/29/2023 10.5  9.0 - 12.5 sec Final    INR 01/29/2023 1.0  0.8 - 1.2 Final    BSA 01/29/2023 1.82  m2 Final    TDI SEPTAL 01/29/2023 0.04  m/s Final    LV LATERAL  E/E' RATIO 01/29/2023 12.67  m/s Final    LV SEPTAL E/E' RATIO 01/29/2023 19.00  m/s Final    Left Ventricular Outflow Tract Nitza* 01/29/2023 0.74  cm/s Final    Left Ventricular Outflow Tract Nitza* 01/29/2023 2.57  mmHg Final    Pulmonary Valve Mean Velocity 01/29/2023 0.79  m/s Final    AORTIC VALVE CUSP SEPERATION 01/29/2023 1.87  cm Final    TDI LATERAL 01/29/2023 0.06  m/s Final    PV PEAK VELOCITY 01/29/2023 1.15  cm/s Final    LVIDd 01/29/2023 2.93 (A)  3.5 - 6.0 cm Final    IVS 01/29/2023 2.05 (A)  0.6 - 1.1 cm Final    Posterior Wall 01/29/2023 1.22 (A)  0.6 - 1.1 cm Final    Ao root annulus 01/29/2023 2.87  cm Final    LVIDs 01/29/2023 1.65 (A)  2.1 - 4.0 cm Final    FS 01/29/2023 44  28 - 44 % Final    Sinus 01/29/2023 2.49  cm Final    STJ 01/29/2023 2.88  cm Final    LV mass 01/29/2023 177.96  g Final    TAPSE 01/29/2023 1.67  cm Final    RV S' 01/29/2023 0.01  cm/s Final    Left Ventricle Relative Wall Thick* 01/29/2023 0.83  cm Final    AV regurgitation pressure 1/2 time 01/29/2023 391.544709136858697  ms Final    AV mean gradient 01/29/2023 5  mmHg Final    AV valve area 01/29/2023 2.46  cm2 Final    AV Velocity Ratio 01/29/2023 0.81   Final    AV index (prosthetic) 01/29/2023 0.78   Final    MV valve area p 1/2 method 01/29/2023 2.30  cm2 Final    E/A ratio 01/29/2023 0.77   Final    Mean e' 01/29/2023 0.05  m/s Final    E wave deceleration time 01/29/2023 329.49  msec Final    LVOT diameter 01/29/2023 2.00  cm Final    LVOT area 01/29/2023 3.1  cm2 Final    LVOT peak nimesh 01/29/2023 1.11  m/s Final    LVOT peak VTI 01/29/2023 22.80  cm Final    Ao peak nimesh 01/29/2023 1.37  m/s Final    Ao VTI 01/29/2023 29.1  cm Final    LVOT stroke volume 01/29/2023 71.59  cm3 Final    AV peak gradient 01/29/2023 8  mmHg Final    E/E' ratio 01/29/2023 15.20  m/s Final    MV Peak E Nimesh 01/29/2023 0.76  m/s Final    TR Max Nimesh 01/29/2023 2.95  m/s Final    MV stenosis pressure 1/2 time 01/29/2023 95.55  ms Final    MV  Peak A Piter 01/29/2023 0.99  m/s Final    LV Systolic Volume 01/29/2023 7.70  mL Final    LV Systolic Volume Index 01/29/2023 4.4  mL/m2 Final    LV Diastolic Volume 01/29/2023 33.12  mL Final    LV Diastolic Volume Index 01/29/2023 18.71  mL/m2 Final    LV Mass Index 01/29/2023 101  g/m2 Final    Triscuspid Valve Regurgitation Pea* 01/29/2023 35  mmHg Final    Right Atrial Pressure (from IVC) 01/29/2023 3  mmHg Final    EF 01/29/2023 78  % Final    TV rest pulmonary artery pressure 01/29/2023 38  mmHg Final    WBC 01/31/2023 4.35  3.90 - 12.70 K/uL Final    RBC 01/31/2023 5.10  4.00 - 5.40 M/uL Final    Hemoglobin 01/31/2023 14.2  12.0 - 16.0 g/dL Final    Hematocrit 01/31/2023 43.0  37.0 - 48.5 % Final    MCV 01/31/2023 84  82 - 98 fL Final    MCH 01/31/2023 27.8  27.0 - 31.0 pg Final    MCHC 01/31/2023 33.0  32.0 - 36.0 g/dL Final    RDW 01/31/2023 15.9 (H)  11.5 - 14.5 % Final    Platelets 01/31/2023 240  150 - 450 K/uL Final    MPV 01/31/2023 10.5  9.2 - 12.9 fL Final    Sodium 01/31/2023 137  136 - 145 mmol/L Final    Potassium 01/31/2023 4.3  3.5 - 5.1 mmol/L Final    Chloride 01/31/2023 105  95 - 110 mmol/L Final    CO2 01/31/2023 22 (L)  23 - 29 mmol/L Final    Glucose 01/31/2023 83  70 - 110 mg/dL Final    BUN 01/31/2023 13  8 - 23 mg/dL Final    Creatinine 01/31/2023 0.9  0.5 - 1.4 mg/dL Final    Calcium 01/31/2023 9.4  8.7 - 10.5 mg/dL Final    Total Protein 01/31/2023 7.4  6.0 - 8.4 g/dL Final    Albumin 01/31/2023 4.0  3.5 - 5.2 g/dL Final    Total Bilirubin 01/31/2023 1.2 (H)  0.1 - 1.0 mg/dL Final    Alkaline Phosphatase 01/31/2023 78  55 - 135 U/L Final    AST 01/31/2023 21  10 - 40 U/L Final    ALT 01/31/2023 13  10 - 44 U/L Final    Anion Gap 01/31/2023 10  8 - 16 mmol/L Final    eGFR 01/31/2023 >60.0  >60 mL/min/1.73 m^2 Final    Magnesium 01/31/2023 1.9  1.6 - 2.6 mg/dL Final       Past Medical History:   Diagnosis Date    Allergies     Essential (primary) hypertension      Past Surgical History:    Procedure Laterality Date    FOOT SURGERY       Family History   Problem Relation Age of Onset    No Known Problems Mother     Stroke Maternal Aunt     Stroke Paternal Cousin     Diabetes Mellitus Other     Cancer Neg Hx     Diabetes Neg Hx     Heart disease Neg Hx     Hypertension Neg Hx        Marital Status:   Alcohol History:  reports no history of alcohol use.  Tobacco History:  reports that she has never smoked. She has never used smokeless tobacco.  Drug History:  reports no history of drug use.    Review of patient's allergies indicates:   Allergen Reactions    Lisinopril Other (See Comments)     angioedema       Current Outpatient Medications:     amLODIPine (NORVASC) 10 MG tablet, Take 1 tablet (10 mg total) by mouth once daily., Disp: 30 tablet, Rfl: 11    aspirin 81 MG Chew, Take 1 tablet (81 mg total) by mouth once daily., Disp: 90 tablet, Rfl: 3    atorvastatin (LIPITOR) 40 MG tablet, Take 1 tablet (40 mg total) by mouth once daily., Disp: 90 tablet, Rfl: 3    clopidogreL (PLAVIX) 75 mg tablet, Take 1 tablet (75 mg total) by mouth once daily. for 21 days, Disp: 21 tablet, Rfl: 0    Review of Systems   Constitutional:  Positive for fatigue. Negative for activity change and unexpected weight change.   HENT:  Negative for hearing loss, postnasal drip, sinus pressure, sore throat and voice change.    Eyes:  Negative for photophobia and visual disturbance.   Respiratory:  Negative for cough, shortness of breath and wheezing.    Cardiovascular:  Negative for chest pain and palpitations.   Gastrointestinal:  Negative for constipation, diarrhea and nausea.   Genitourinary:  Negative for difficulty urinating, frequency, hematuria and urgency.   Musculoskeletal:  Positive for arthralgias. Negative for back pain.   Skin:  Negative for rash.   Neurological:  Negative for weakness, light-headedness and headaches.   Hematological:  Negative for adenopathy. Does not bruise/bleed easily.  "  Psychiatric/Behavioral:  The patient is not nervous/anxious.       Objective:      Vitals:    02/15/23 1155 02/15/23 1204   BP: (!) 116/58 120/60   Pulse: 94    SpO2: 98%    Weight: 69.9 kg (154 lb 3.2 oz)    Height: 4' 9.8" (1.468 m)      Physical Exam  Constitutional:       Comments: Frail elderly female in NAD   HENT:      Head: Normocephalic and atraumatic.      Mouth/Throat:      Mouth: Mucous membranes are moist.   Eyes:      Conjunctiva/sclera: Conjunctivae normal.   Cardiovascular:      Rate and Rhythm: Normal rate.   Pulmonary:      Effort: Pulmonary effort is normal.   Musculoskeletal:      Lumbar back: Scoliosis present.      Comments: Kyphosis    Neurological:      General: No focal deficit present.      Mental Status: She is alert and oriented to person, place, and time.   Psychiatric:         Mood and Affect: Mood normal.         Behavior: Behavior normal.       Assessment:       1. Cerebrovascular accident (CVA) due to occlusion of precerebral artery    2. 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 Arteriosclerosis of carotid artery, bilateral    4. 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            "

## 2023-02-15 NOTE — PATIENT INSTRUCTIONS
Monitor blood pressures. We will decrease amlodipine if they drop too low  Continue clopidigel until it runs out then stop

## 2023-02-15 NOTE — TELEPHONE ENCOUNTER
Attempted to call patient alok Post to schedule 3 mo follow up appointment. Mail box is full and can not take any messages. Will attempt calling later today.

## 2023-04-02 PROCEDURE — G0179 PR HOME HEALTH MD RECERTIFICATION: ICD-10-PCS | Mod: ,,, | Performed by: FAMILY MEDICINE

## 2023-04-02 PROCEDURE — G0179 MD RECERTIFICATION HHA PT: HCPCS | Mod: ,,, | Performed by: FAMILY MEDICINE

## 2023-04-13 ENCOUNTER — EXTERNAL HOME HEALTH (OUTPATIENT)
Dept: HOME HEALTH SERVICES | Facility: HOSPITAL | Age: 88
End: 2023-04-13
Payer: MEDICARE

## 2023-04-18 ENCOUNTER — EXTERNAL HOME HEALTH (OUTPATIENT)
Dept: HOME HEALTH SERVICES | Facility: HOSPITAL | Age: 88
End: 2023-04-18
Payer: MEDICARE

## 2023-04-18 ENCOUNTER — DOCUMENT SCAN (OUTPATIENT)
Dept: HOME HEALTH SERVICES | Facility: HOSPITAL | Age: 88
End: 2023-04-18
Payer: MEDICARE

## 2023-05-01 PROBLEM — I63.9 CVA (CEREBRAL VASCULAR ACCIDENT): Status: RESOLVED | Noted: 2023-01-29 | Resolved: 2023-05-01

## 2023-05-04 ENCOUNTER — TELEPHONE (OUTPATIENT)
Dept: FAMILY MEDICINE | Facility: CLINIC | Age: 88
End: 2023-05-04

## 2023-05-04 NOTE — TELEPHONE ENCOUNTER
Spoke to patient  that lab is due a week prior to visit. Said she has a hard time getting a ride and will get done day of visit.

## 2023-05-18 ENCOUNTER — TELEPHONE (OUTPATIENT)
Dept: FAMILY MEDICINE | Facility: CLINIC | Age: 88
End: 2023-05-18

## 2023-05-18 NOTE — TELEPHONE ENCOUNTER
----- Message from Ashley Lopez sent at 5/18/2023  9:58 AM CDT -----  Pt needs to reschedule her missed appt. Pt #[phone]

## 2023-05-27 RX ORDER — CLOPIDOGREL BISULFATE 75 MG/1
75 TABLET ORAL DAILY
Qty: 21 TABLET | Refills: 0 | OUTPATIENT
Start: 2023-05-27 | End: 2023-06-17

## 2023-06-21 ENCOUNTER — OFFICE VISIT (OUTPATIENT)
Dept: FAMILY MEDICINE | Facility: CLINIC | Age: 88
End: 2023-06-21
Attending: FAMILY MEDICINE
Payer: MEDICARE

## 2023-06-21 VITALS
HEART RATE: 84 BPM | HEIGHT: 55 IN | DIASTOLIC BLOOD PRESSURE: 64 MMHG | OXYGEN SATURATION: 98 % | WEIGHT: 165.81 LBS | BODY MASS INDEX: 38.37 KG/M2 | SYSTOLIC BLOOD PRESSURE: 114 MMHG

## 2023-06-21 DIAGNOSIS — M40.04 POSTURAL KYPHOSIS OF THORACIC REGION: ICD-10-CM

## 2023-06-21 DIAGNOSIS — I63.20 CEREBROVASCULAR ACCIDENT (CVA) DUE TO OCCLUSION OF PRECEREBRAL ARTERY: ICD-10-CM

## 2023-06-21 DIAGNOSIS — I10 PRIMARY HYPERTENSION: Primary | ICD-10-CM

## 2023-06-21 DIAGNOSIS — I65.23 ARTERIOSCLEROSIS OF CAROTID ARTERY, BILATERAL: ICD-10-CM

## 2023-06-21 PROCEDURE — 99214 OFFICE O/P EST MOD 30 MIN: CPT | Mod: S$GLB,,, | Performed by: FAMILY MEDICINE

## 2023-06-21 PROCEDURE — 99214 PR OFFICE/OUTPT VISIT, EST, LEVL IV, 30-39 MIN: ICD-10-PCS | Mod: S$GLB,,, | Performed by: FAMILY MEDICINE

## 2023-06-21 NOTE — PROGRESS NOTES
SUBJECTIVE:    Patient ID: Heidi Fine is a 87 y.o. female.    Chief Complaint: Follow-up (Follow up/ when at hospital states blood pressure was elevated and doesn't understand why this was the case//mp)    The patient recently established care with me after acute side effects from antihypertensives.  She was seen in the emergency room in late January of this year and diagnosed with CVA.  She was placed on ACE-inhibitor.  Few days later had angioedema and these medications were discontinued.  She had been on Plavix and has been given amlodipine, atorvastatin aspirin.  She has since done well on these medications with no difficulties.    Patient did have acute kidney injury which improved prior to hospital discharge.  Repeat labs will be due.  Questioning reveals that the patient is confused when he comes today aches of her hospitalizations.  She also has some concerns as to how good her blood pressure elevate because she reports no previous history      Past Medical History:   Diagnosis Date    Allergies     Essential (primary) hypertension      Past Surgical History:   Procedure Laterality Date    FOOT SURGERY       Family History   Problem Relation Age of Onset    No Known Problems Mother     Stroke Maternal Aunt     Stroke Paternal Cousin     Diabetes Mellitus Other     Cancer Neg Hx     Diabetes Neg Hx     Heart disease Neg Hx     Hypertension Neg Hx        Marital Status:   Alcohol History:  reports no history of alcohol use.  Tobacco History:  reports that she has never smoked. She has never used smokeless tobacco.  Drug History:  reports no history of drug use.    Review of patient's allergies indicates:   Allergen Reactions    Lisinopril Other (See Comments)     angioedema       Current Outpatient Medications:     amLODIPine (NORVASC) 10 MG tablet, Take 1 tablet (10 mg total) by mouth once daily., Disp: 30 tablet, Rfl: 11    aspirin 81 MG Chew, Take 1 tablet (81 mg total) by mouth once daily., Disp:  "90 tablet, Rfl: 3    atorvastatin (LIPITOR) 40 MG tablet, Take 1 tablet (40 mg total) by mouth once daily., Disp: 90 tablet, Rfl: 3    Review of Systems   Constitutional:  Negative for activity change, fatigue and unexpected weight change.   HENT:  Negative for hearing loss, postnasal drip, sinus pressure, sore throat and voice change.    Eyes:  Negative for photophobia and visual disturbance.   Respiratory:  Negative for cough, shortness of breath and wheezing.    Cardiovascular:  Negative for chest pain and palpitations.   Gastrointestinal:  Negative for constipation, diarrhea and nausea.   Genitourinary:  Negative for difficulty urinating, frequency, hematuria and urgency.   Musculoskeletal:  Negative for arthralgias and back pain.   Skin:  Negative for rash.   Neurological:  Negative for weakness, light-headedness and headaches.   Hematological:  Negative for adenopathy. Does not bruise/bleed easily.   Psychiatric/Behavioral:  The patient is not nervous/anxious.         Objective:      Vitals:    06/21/23 1518   BP: 114/64   Pulse: 84   SpO2: 98%   Weight: 75.2 kg (165 lb 12.8 oz)   Height: 4' 6.33" (1.38 m)     Physical Exam  Constitutional:       Appearance: Normal appearance.      Comments: Petite elderly female   HENT:      Head: Normocephalic and atraumatic.      Mouth/Throat:      Mouth: Mucous membranes are moist.   Eyes:      Conjunctiva/sclera: Conjunctivae normal.   Cardiovascular:      Rate and Rhythm: Normal rate and regular rhythm.   Pulmonary:      Effort: Pulmonary effort is normal.      Breath sounds: Normal breath sounds.   Abdominal:      General: Bowel sounds are normal.      Palpations: Abdomen is soft.   Musculoskeletal:         General: No swelling.      Left hip: Decreased range of motion.      Comments:  severe thoracic kyphosis   Neurological:      General: No focal deficit present.      Mental Status: She is alert and oriented to person, place, and time.      Gait: Gait abnormal. "   Psychiatric:         Mood and Affect: Mood normal. Affect is blunt.         Speech: Speech normal.         Behavior: Behavior normal.         Cognition and Memory: She exhibits impaired recent memory.         Assessment:       1. Primary hypertension    2. Cerebrovascular accident (CVA) due to occlusion of precerebral artery    3. Arteriosclerosis of carotid artery, bilateral    4. Postural kyphosis of thoracic region         Plan:       Primary hypertension  -     CBC Auto Differential; Future; Expected date: 06/21/2023  -     Microalbumin/Creatinine Ratio, Urine; Future; Expected date: 06/21/2023  -     Comprehensive Metabolic Panel; Future; Expected date: 06/21/2023  -     TSH; Future; Expected date: 06/21/2023  -     Urinalysis; Future; Expected date: 06/21/2023  -     Lipid Panel; Future; Expected date: 06/21/2023    Cerebrovascular accident (CVA) due to occlusion of precerebral artery  -     Comprehensive Metabolic Panel; Future; Expected date: 06/21/2023    Arteriosclerosis of carotid artery, bilateral  -     Lipid Panel; Future; Expected date: 06/21/2023    Postural kyphosis of thoracic region      Follow up in about 6 months (around 12/21/2023) for HTN.

## 2023-07-21 ENCOUNTER — TELEPHONE (OUTPATIENT)
Dept: FAMILY MEDICINE | Facility: CLINIC | Age: 88
End: 2023-07-21

## 2023-07-21 NOTE — TELEPHONE ENCOUNTER
----- Message from Sima Parsons sent at 7/21/2023  8:52 AM CDT -----  Patient called and want to know why her blood pressure went up she stated that she does not know why it happen and she want to know she stated that she called her insurance company and they told her that she has a right to know and she stated that she need to know now (very rude and I had a hard time talking with her)please give her a call at [phone]

## 2023-07-21 NOTE — TELEPHONE ENCOUNTER
Spoke with patient states we should have the information on why her blood pressure is high.  States that she was told by the hospital that we would give her the information.  Patient talks about how the hospital lied and that workers should tell the truth, because right is right and wrong is wrong.  Patient states also she was never informed of CVA.  Patient states she should have been informed of this.  Informed patient of hospital admit 1/28/23 - 2/14/23 where dx of CVA was given.  Patient states this information is incorrect she was never in the hospital during that time frame.  States someone has put the information in wrong.  Informed patient she may contact medical records regarding possible error if she wishes.  Offered appointment for patient to come in and discuss concerns.  Patient declines at this time.  Patient states she will check with her son for good times for an appointment and will return call -DN

## 2023-08-09 ENCOUNTER — TELEPHONE (OUTPATIENT)
Dept: FAMILY MEDICINE | Facility: CLINIC | Age: 88
End: 2023-08-09

## 2023-08-09 NOTE — TELEPHONE ENCOUNTER
Spoke with patient wanting to know why she has uncontrolled htn in her chart.  Informed unsure but can schedule ov to discuss. Patient declines states she will wait until December visit.  Informed patient to call with any questions/concerns -DN

## 2023-10-24 ENCOUNTER — TELEPHONE (OUTPATIENT)
Dept: FAMILY MEDICINE | Facility: CLINIC | Age: 88
End: 2023-10-24

## 2023-10-24 NOTE — TELEPHONE ENCOUNTER
----- Message from Roz Tavarez sent at 10/24/2023 12:50 PM CDT -----  The patient has blood in her urine and when  she wipes there is blood on the toilet paper. Pt's # [phone]

## 2023-10-24 NOTE — TELEPHONE ENCOUNTER
Spoke with patient states no longer seeing blood at this time.  Will return call if bleeding persists -DN

## 2023-12-01 ENCOUNTER — TELEPHONE (OUTPATIENT)
Dept: FAMILY MEDICINE | Facility: CLINIC | Age: 88
End: 2023-12-01

## 2023-12-19 ENCOUNTER — OFFICE VISIT (OUTPATIENT)
Dept: FAMILY MEDICINE | Facility: CLINIC | Age: 88
End: 2023-12-19
Attending: FAMILY MEDICINE
Payer: MEDICARE

## 2023-12-19 VITALS
HEART RATE: 70 BPM | SYSTOLIC BLOOD PRESSURE: 138 MMHG | WEIGHT: 163 LBS | HEIGHT: 55 IN | DIASTOLIC BLOOD PRESSURE: 88 MMHG | BODY MASS INDEX: 37.72 KG/M2 | OXYGEN SATURATION: 99 %

## 2023-12-19 DIAGNOSIS — I63.20 CEREBROVASCULAR ACCIDENT (CVA) DUE TO OCCLUSION OF PRECEREBRAL ARTERY: ICD-10-CM

## 2023-12-19 DIAGNOSIS — R41.3 POOR MEMORY: ICD-10-CM

## 2023-12-19 DIAGNOSIS — Z28.21 IMMUNIZATION REFUSED: ICD-10-CM

## 2023-12-19 DIAGNOSIS — M40.04 POSTURAL KYPHOSIS OF THORACIC REGION: ICD-10-CM

## 2023-12-19 DIAGNOSIS — N18.4 STAGE 4 CHRONIC KIDNEY DISEASE: Primary | ICD-10-CM

## 2023-12-19 DIAGNOSIS — I65.23 ARTERIOSCLEROSIS OF CAROTID ARTERY, BILATERAL: ICD-10-CM

## 2023-12-19 PROCEDURE — 99214 OFFICE O/P EST MOD 30 MIN: CPT | Mod: S$GLB,,, | Performed by: FAMILY MEDICINE

## 2023-12-19 RX ORDER — ATORVASTATIN CALCIUM 40 MG/1
40 TABLET, FILM COATED ORAL DAILY
Qty: 90 TABLET | Refills: 3 | Status: SHIPPED | OUTPATIENT
Start: 2023-12-19 | End: 2024-12-18

## 2023-12-19 RX ORDER — NAPROXEN SODIUM 220 MG/1
81 TABLET, FILM COATED ORAL DAILY
Qty: 90 TABLET | Refills: 3 | Status: SHIPPED | OUTPATIENT
Start: 2023-12-19 | End: 2024-12-18

## 2023-12-19 NOTE — PROGRESS NOTES
SUBJECTIVE:    Patient ID: Heidi Fine is a 88 y.o. female.    Chief Complaint: Follow-up (No bottles//Pt is here for a check up//Decline pna vaccine//PILAR )    Patient presents for follow-up visit.  She is a poor historian has not been taking her medications.  She also has not gotten blood work that was ordered for additional evaluation.  In review of her previous testing a radiology findings showed a stroke in January 2023.  She is unaccompanied in her visit today.    Has hypertension but blood pressure today is okay.  Patient refuses any immuizations.          Past Medical History:   Diagnosis Date    Allergies     Essential (primary) hypertension      Past Surgical History:   Procedure Laterality Date    FOOT SURGERY       Family History   Problem Relation Age of Onset    No Known Problems Mother     Stroke Maternal Aunt     Stroke Paternal Cousin     Diabetes Mellitus Other     Cancer Neg Hx     Diabetes Neg Hx     Heart disease Neg Hx     Hypertension Neg Hx        Marital Status:   Alcohol History:  reports no history of alcohol use.  Tobacco History:  reports that she has never smoked. She has never used smokeless tobacco.  Drug History:  reports no history of drug use.    Review of patient's allergies indicates:   Allergen Reactions    Lisinopril Other (See Comments)     angioedema       Current Outpatient Medications:     aspirin 81 MG Chew, Take 1 tablet (81 mg total) by mouth once daily., Disp: 90 tablet, Rfl: 3    atorvastatin (LIPITOR) 40 MG tablet, Take 1 tablet (40 mg total) by mouth once daily., Disp: 90 tablet, Rfl: 3    Review of Systems   Constitutional:  Negative for activity change, fatigue and unexpected weight change.   HENT:  Negative for hearing loss, postnasal drip, sinus pressure, sore throat and voice change.    Eyes:  Negative for photophobia and visual disturbance.   Respiratory:  Negative for cough, shortness of breath and wheezing.    Cardiovascular:  Negative for chest pain  "and palpitations.   Gastrointestinal:  Negative for constipation, diarrhea and nausea.   Genitourinary:  Negative for difficulty urinating, frequency, hematuria and urgency.   Musculoskeletal:  Negative for arthralgias and back pain.   Skin:  Negative for rash.   Neurological:  Negative for weakness, light-headedness and headaches.   Hematological:  Negative for adenopathy. Does not bruise/bleed easily.   Psychiatric/Behavioral:  The patient is not nervous/anxious.           Objective:      Vitals:    12/19/23 1453   BP: 138/88   Pulse: 70   SpO2: 99%   Weight: 73.9 kg (163 lb)   Height: 4' 6" (1.372 m)     Physical Exam  Constitutional:       Appearance: Normal appearance.   HENT:      Head: Normocephalic and atraumatic.      Mouth/Throat:      Mouth: Mucous membranes are moist.   Eyes:      Conjunctiva/sclera: Conjunctivae normal.   Pulmonary:      Effort: Pulmonary effort is normal.   Musculoskeletal:         General: Deformity present.   Neurological:      General: No focal deficit present.      Mental Status: She is alert and oriented to person, place, and time.   Psychiatric:         Mood and Affect: Mood normal.         Behavior: Behavior normal.         Cognition and Memory: Memory is impaired.           No visits with results within 6 Month(s) from this visit.   Latest known visit with results is:   Admission on 02/03/2023, Discharged on 02/03/2023   Component Date Value Ref Range Status    WBC 02/03/2023 4.02  3.90 - 12.70 K/uL Final    RBC 02/03/2023 4.90  4.00 - 5.40 M/uL Final    Hemoglobin 02/03/2023 13.5  12.0 - 16.0 g/dL Final    Hematocrit 02/03/2023 40.8  37.0 - 48.5 % Final    MCV 02/03/2023 83  82 - 98 fL Final    MCH 02/03/2023 27.6  27.0 - 31.0 pg Final    MCHC 02/03/2023 33.1  32.0 - 36.0 g/dL Final    RDW 02/03/2023 15.4 (H)  11.5 - 14.5 % Final    Platelets 02/03/2023 252  150 - 450 K/uL Final    MPV 02/03/2023 10.1  9.2 - 12.9 fL Final    Immature Granulocytes 02/03/2023 0.5  0.0 - 0.5 % " Final    Gran # (ANC) 02/03/2023 2.2  1.8 - 7.7 K/uL Final    Immature Grans (Abs) 02/03/2023 0.02  0.00 - 0.04 K/uL Final    Lymph # 02/03/2023 1.1  1.0 - 4.8 K/uL Final    Mono # 02/03/2023 0.6  0.3 - 1.0 K/uL Final    Eos # 02/03/2023 0.1  0.0 - 0.5 K/uL Final    Baso # 02/03/2023 0.02  0.00 - 0.20 K/uL Final    nRBC 02/03/2023 0  0 /100 WBC Final    Gran % 02/03/2023 54.3  38.0 - 73.0 % Final    Lymph % 02/03/2023 27.1  18.0 - 48.0 % Final    Mono % 02/03/2023 15.4 (H)  4.0 - 15.0 % Final    Eosinophil % 02/03/2023 2.2  0.0 - 8.0 % Final    Basophil % 02/03/2023 0.5  0.0 - 1.9 % Final    Differential Method 02/03/2023 Automated   Final    Sodium 02/03/2023 138  136 - 145 mmol/L Final    Potassium 02/03/2023 4.3  3.5 - 5.1 mmol/L Final    Chloride 02/03/2023 107  95 - 110 mmol/L Final    CO2 02/03/2023 21 (L)  23 - 29 mmol/L Final    Glucose 02/03/2023 101  70 - 110 mg/dL Final    BUN 02/03/2023 31 (H)  8 - 23 mg/dL Final    Creatinine 02/03/2023 1.8 (H)  0.5 - 1.4 mg/dL Final    Calcium 02/03/2023 9.3  8.7 - 10.5 mg/dL Final    Total Protein 02/03/2023 7.8  6.0 - 8.4 g/dL Final    Albumin 02/03/2023 4.0  3.5 - 5.2 g/dL Final    Total Bilirubin 02/03/2023 0.7  0.1 - 1.0 mg/dL Final    Alkaline Phosphatase 02/03/2023 76  55 - 135 U/L Final    AST 02/03/2023 28  10 - 40 U/L Final    ALT 02/03/2023 16  10 - 44 U/L Final    Anion Gap 02/03/2023 10  8 - 16 mmol/L Final    eGFR 02/03/2023 26.9 (A)  >60 mL/min/1.73 m^2 Final        Assessment:       1. Stage 4 chronic kidney disease    2. Postural kyphosis of thoracic region    3. Arteriosclerosis of carotid artery, bilateral    4. Cerebrovascular accident (CVA) due to occlusion of precerebral artery    5. Immunization refused    6. Poor memory         Plan:       Stage 4 chronic kidney disease  -     Microalbumin/Creatinine Ratio, Urine; Future; Expected date: 12/19/2023  -     Comprehensive Metabolic Panel; Future; Expected date: 12/19/2023    Postural kyphosis of  thoracic region    Arteriosclerosis of carotid artery, bilateral  -     Lipid Panel; Future; Expected date: 12/19/2023  -     Microalbumin/Creatinine Ratio, Urine; Future; Expected date: 12/19/2023  -     Comprehensive Metabolic Panel; Future; Expected date: 12/19/2023  -     atorvastatin (LIPITOR) 40 MG tablet; Take 1 tablet (40 mg total) by mouth once daily.  Dispense: 90 tablet; Refill: 3  -     aspirin 81 MG Chew; Take 1 tablet (81 mg total) by mouth once daily.  Dispense: 90 tablet; Refill: 3    Cerebrovascular accident (CVA) due to occlusion of precerebral artery  -     Lipid Panel; Future; Expected date: 12/19/2023  -     Microalbumin/Creatinine Ratio, Urine; Future; Expected date: 12/19/2023  -     Comprehensive Metabolic Panel; Future; Expected date: 12/19/2023  -     atorvastatin (LIPITOR) 40 MG tablet; Take 1 tablet (40 mg total) by mouth once daily.  Dispense: 90 tablet; Refill: 3  -     aspirin 81 MG Chew; Take 1 tablet (81 mg total) by mouth once daily.  Dispense: 90 tablet; Refill: 3    Immunization refused    Poor memory      Follow up in about 6 months (around 6/19/2024) for kidney .

## 2023-12-21 ENCOUNTER — TELEPHONE (OUTPATIENT)
Dept: FAMILY MEDICINE | Facility: CLINIC | Age: 88
End: 2023-12-21
Payer: MEDICARE

## 2023-12-21 NOTE — TELEPHONE ENCOUNTER
----- Message from Oneida Bradley sent at 12/21/2023  3:04 PM CST -----  Pt is calling about medication we called in and she is not sure what it is   [phone]

## 2023-12-30 LAB
ALBUMIN SERPL-MCNC: 4.1 G/DL (ref 3.6–5.1)
ALBUMIN/CREAT UR: 10 MCG/MG CREAT
ALBUMIN/GLOB SERPL: 1.4 (CALC) (ref 1–2.5)
ALP SERPL-CCNC: 94 U/L (ref 37–153)
ALT SERPL-CCNC: 14 U/L (ref 6–29)
AST SERPL-CCNC: 21 U/L (ref 10–35)
BILIRUB SERPL-MCNC: 0.8 MG/DL (ref 0.2–1.2)
BUN SERPL-MCNC: 13 MG/DL (ref 7–25)
BUN/CREAT SERPL: ABNORMAL (CALC) (ref 6–22)
CALCIUM SERPL-MCNC: 9.7 MG/DL (ref 8.6–10.4)
CHLORIDE SERPL-SCNC: 103 MMOL/L (ref 98–110)
CHOLEST SERPL-MCNC: 118 MG/DL
CHOLEST/HDLC SERPL: 2.4 (CALC)
CO2 SERPL-SCNC: 31 MMOL/L (ref 20–32)
CREAT SERPL-MCNC: 0.95 MG/DL (ref 0.6–0.95)
CREAT UR-MCNC: 39 MG/DL (ref 20–275)
EGFR: 58 ML/MIN/1.73M2
GLOBULIN SER CALC-MCNC: 2.9 G/DL (CALC) (ref 1.9–3.7)
GLUCOSE SERPL-MCNC: 81 MG/DL (ref 65–139)
HDLC SERPL-MCNC: 49 MG/DL
LDLC SERPL CALC-MCNC: 55 MG/DL (CALC)
MICROALBUMIN UR-MCNC: 0.4 MG/DL
NONHDLC SERPL-MCNC: 69 MG/DL (CALC)
POTASSIUM SERPL-SCNC: 4.2 MMOL/L (ref 3.5–5.3)
PROT SERPL-MCNC: 7 G/DL (ref 6.1–8.1)
SODIUM SERPL-SCNC: 141 MMOL/L (ref 135–146)
TRIGL SERPL-MCNC: 59 MG/DL

## 2024-01-03 ENCOUNTER — TELEPHONE (OUTPATIENT)
Dept: FAMILY MEDICINE | Facility: CLINIC | Age: 89
End: 2024-01-03
Payer: MEDICARE

## 2024-01-03 NOTE — TELEPHONE ENCOUNTER
----- Message from Roz Tavarez sent at 1/3/2024 10:54 AM CST -----  Please call with her lab results.. She is concern about her results. Pt's # [phone]

## 2024-01-05 NOTE — TELEPHONE ENCOUNTER
Let pt know that     1. the cholesterol panel is acceptable. Bad cholesterol LDL is good at 55. Good cholesterol is 49.  2. The liver function  is normal and kidney function is slightly decreased but better than it was 11 months ago.  Not spilling any microprotein in the urine.  3. The glucose is slightly normal.

## 2024-03-25 ENCOUNTER — TELEPHONE (OUTPATIENT)
Dept: FAMILY MEDICINE | Facility: CLINIC | Age: 89
End: 2024-03-25
Payer: MEDICARE

## 2024-03-25 NOTE — TELEPHONE ENCOUNTER
----- Message from Roz Tavarez sent at 3/25/2024  3:45 PM CDT -----  Her son Sincere is calling back. Sincere tried to explain to hi mom about the prescription.   pt's # [phone] GH

## 2024-03-28 ENCOUNTER — TELEPHONE (OUTPATIENT)
Dept: FAMILY MEDICINE | Facility: CLINIC | Age: 89
End: 2024-03-28
Payer: MEDICARE

## 2024-03-28 NOTE — TELEPHONE ENCOUNTER
Spoke with pt. Pt went on and on regarding she has never taken any medications and she is not going to start. States she was sent a medications that she does not know what it is for. Asked pt name of meds pt states she threw it away. Pt states the Dr. Should tell pts when they are prescribing things. Pt states she asked for a call back last week and never received one. Advised I do see where a nurse spoke with her last week. Pt declined. To you as LUISA

## 2024-03-28 NOTE — TELEPHONE ENCOUNTER
----- Message from Kristi Perez sent at 3/28/2024  9:02 AM CDT -----  Pt needs clarification on prescriptions that was sent to pharmacy. She will not be taking medications without an explanation on why she needs to take medicine.  [phone]

## 2024-04-24 ENCOUNTER — HOSPITAL ENCOUNTER (EMERGENCY)
Facility: HOSPITAL | Age: 89
Discharge: LEFT BEFORE DISCHARGE | End: 2024-04-24
Payer: MEDICARE

## 2024-04-24 VITALS
WEIGHT: 150 LBS | HEART RATE: 88 BPM | BODY MASS INDEX: 27.6 KG/M2 | OXYGEN SATURATION: 99 % | TEMPERATURE: 97 F | HEIGHT: 62 IN | DIASTOLIC BLOOD PRESSURE: 103 MMHG | SYSTOLIC BLOOD PRESSURE: 213 MMHG | RESPIRATION RATE: 18 BRPM
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
## 2024-04-24 PROCEDURE — 99281 EMR DPT VST MAYX REQ PHY/QHP: CPT | Mod: 25

## 2024-04-25 NOTE — FIRST PROVIDER EVALUATION
Emergency Department TeleTriage Encounter Note      CHIEF COMPLAINT    Chief Complaint   Patient presents with    Fall    Leg Pain     Pt says she fell a few days ago and has been having pain in her L leg in the back below her knee       VITAL SIGNS   Initial Vitals [04/24/24 1930]   BP Pulse Resp Temp SpO2   (!) 213/103 88 18 97.2 °F (36.2 °C) 99 %      MAP       --            ALLERGIES    Review of patient's allergies indicates:   Allergen Reactions    Lisinopril Other (See Comments)     angioedema       PROVIDER TRIAGE NOTE  80-year-old female presents to the ER for evaluation of pain to the left leg.  Patient reports a fall a few days ago.  No head injury, no LOC.      ORDERS  Labs Reviewed - No data to display    ED Orders (720h ago, onward)      None              Virtual Visit Note: The provider triage portion of this emergency department evaluation and documentation was performed via [employer], a HIPAA-compliant telemedicine application, in concert with a tele-presenter in the room. A face to face patient evaluation with one of my colleagues will occur once the patient is placed in an emergency department room.      DISCLAIMER: This note was prepared with [employer] voice recognition transcription software. Garbled syntax, mangled pronouns, and other bizarre constructions may be attributed to that software system.

## 2024-06-03 ENCOUNTER — TELEPHONE (OUTPATIENT)
Dept: FAMILY MEDICINE | Facility: CLINIC | Age: 89
End: 2024-06-03
Payer: MEDICARE

## 2024-06-03 NOTE — TELEPHONE ENCOUNTER
----- Message from Leela Black sent at 6/3/2024  1:54 PM CDT -----  Pt is upset that we are not on her insurance and wants to talk to a nurse.  [phone]

## 2024-06-03 NOTE — TELEPHONE ENCOUNTER
When speaking with pt she cont to say right is right and wrong is wrong and she should have been advised before June.   Informed pt I do apologize.     Did call pt Son to advise we are no longer on her plan. He voiced understanding states he will find new pcp

## 2024-11-23 ENCOUNTER — HOSPITAL ENCOUNTER (EMERGENCY)
Facility: HOSPITAL | Age: 89
Discharge: HOME | End: 2024-11-24
Attending: STUDENT IN AN ORGANIZED HEALTH CARE EDUCATION/TRAINING PROGRAM
Payer: MEDICARE

## 2024-11-23 DIAGNOSIS — R10.9 ABDOMINAL PAIN: ICD-10-CM

## 2024-11-23 DIAGNOSIS — K52.9 COLITIS: ICD-10-CM

## 2024-11-23 DIAGNOSIS — K42.0 IRREDUCIBLE UMBILICAL HERNIA: Primary | ICD-10-CM

## 2024-11-23 LAB
ALBUMIN SERPL BCP-MCNC: 3.6 G/DL (ref 3.5–5.2)
ALP SERPL-CCNC: 83 U/L (ref 55–135)
ALT SERPL W/O P-5'-P-CCNC: 9 U/L (ref 10–44)
ANION GAP SERPL CALC-SCNC: 9 MMOL/L (ref 8–16)
AST SERPL-CCNC: 23 U/L (ref 10–40)
BACTERIA #/AREA URNS HPF: ABNORMAL /HPF
BASOPHILS # BLD AUTO: 0.01 K/UL (ref 0–0.2)
BASOPHILS NFR BLD: 0.2 % (ref 0–1.9)
BILIRUB SERPL-MCNC: 0.6 MG/DL (ref 0.1–1)
BILIRUB UR QL STRIP: NEGATIVE
BUN SERPL-MCNC: 16 MG/DL (ref 8–23)
CALCIUM SERPL-MCNC: 9.3 MG/DL (ref 8.7–10.5)
CHLORIDE SERPL-SCNC: 102 MMOL/L (ref 95–110)
CLARITY UR: CLEAR
CO2 SERPL-SCNC: 28 MMOL/L (ref 23–29)
COLOR UR: YELLOW
CREAT SERPL-MCNC: 1.2 MG/DL (ref 0.5–1.4)
DIFFERENTIAL METHOD BLD: ABNORMAL
EOSINOPHIL # BLD AUTO: 0 K/UL (ref 0–0.5)
EOSINOPHIL NFR BLD: 0.7 % (ref 0–8)
ERYTHROCYTE [DISTWIDTH] IN BLOOD BY AUTOMATED COUNT: 14.1 % (ref 11.5–14.5)
EST. GFR  (NO RACE VARIABLE): 43.3 ML/MIN/1.73 M^2
GLUCOSE SERPL-MCNC: 108 MG/DL (ref 70–110)
GLUCOSE UR QL STRIP: NEGATIVE
HCT VFR BLD AUTO: 39 % (ref 37–48.5)
HGB BLD-MCNC: 12.5 G/DL (ref 12–16)
HGB UR QL STRIP: ABNORMAL
IMM GRANULOCYTES # BLD AUTO: 0.02 K/UL (ref 0–0.04)
IMM GRANULOCYTES NFR BLD AUTO: 0.5 % (ref 0–0.5)
KETONES UR QL STRIP: NEGATIVE
LEUKOCYTE ESTERASE UR QL STRIP: NEGATIVE
LIPASE SERPL-CCNC: 45 U/L (ref 4–60)
LYMPHOCYTES # BLD AUTO: 1 K/UL (ref 1–4.8)
LYMPHOCYTES NFR BLD: 22.5 % (ref 18–48)
MCH RBC QN AUTO: 25.6 PG (ref 27–31)
MCHC RBC AUTO-ENTMCNC: 32.1 G/DL (ref 32–36)
MCV RBC AUTO: 80 FL (ref 82–98)
MICROSCOPIC COMMENT: ABNORMAL
MONOCYTES # BLD AUTO: 0.6 K/UL (ref 0.3–1)
MONOCYTES NFR BLD: 13.8 % (ref 4–15)
NEUTROPHILS # BLD AUTO: 2.7 K/UL (ref 1.8–7.7)
NEUTROPHILS NFR BLD: 62.3 % (ref 38–73)
NITRITE UR QL STRIP: NEGATIVE
NRBC BLD-RTO: 0 /100 WBC
PH UR STRIP: 6 [PH] (ref 5–8)
PLATELET # BLD AUTO: 461 K/UL (ref 150–450)
PMV BLD AUTO: 9.6 FL (ref 9.2–12.9)
POTASSIUM SERPL-SCNC: 3.1 MMOL/L (ref 3.5–5.1)
PROT SERPL-MCNC: 7.3 G/DL (ref 6–8.4)
PROT UR QL STRIP: NEGATIVE
RBC # BLD AUTO: 4.88 M/UL (ref 4–5.4)
RBC #/AREA URNS HPF: 30 /HPF (ref 0–4)
SODIUM SERPL-SCNC: 139 MMOL/L (ref 136–145)
SP GR UR STRIP: 1.01 (ref 1–1.03)
SQUAMOUS #/AREA URNS HPF: 4 /HPF
URN SPEC COLLECT METH UR: ABNORMAL
UROBILINOGEN UR STRIP-ACNC: NEGATIVE EU/DL
WBC # BLD AUTO: 4.27 K/UL (ref 3.9–12.7)
WBC #/AREA URNS HPF: 3 /HPF (ref 0–5)

## 2024-11-23 PROCEDURE — 25500020 PHARM REV CODE 255: Performed by: STUDENT IN AN ORGANIZED HEALTH CARE EDUCATION/TRAINING PROGRAM

## 2024-11-23 PROCEDURE — 63600175 PHARM REV CODE 636 W HCPCS: Performed by: STUDENT IN AN ORGANIZED HEALTH CARE EDUCATION/TRAINING PROGRAM

## 2024-11-23 PROCEDURE — 81001 URINALYSIS AUTO W/SCOPE: CPT | Performed by: NURSE PRACTITIONER

## 2024-11-23 PROCEDURE — 85025 COMPLETE CBC W/AUTO DIFF WBC: CPT | Performed by: NURSE PRACTITIONER

## 2024-11-23 PROCEDURE — 83690 ASSAY OF LIPASE: CPT | Performed by: NURSE PRACTITIONER

## 2024-11-23 PROCEDURE — 96375 TX/PRO/DX INJ NEW DRUG ADDON: CPT

## 2024-11-23 PROCEDURE — 25000003 PHARM REV CODE 250: Performed by: STUDENT IN AN ORGANIZED HEALTH CARE EDUCATION/TRAINING PROGRAM

## 2024-11-23 PROCEDURE — 96374 THER/PROPH/DIAG INJ IV PUSH: CPT

## 2024-11-23 PROCEDURE — 80053 COMPREHEN METABOLIC PANEL: CPT | Performed by: NURSE PRACTITIONER

## 2024-11-23 PROCEDURE — 93010 ELECTROCARDIOGRAM REPORT: CPT | Mod: ,,, | Performed by: GENERAL PRACTICE

## 2024-11-23 PROCEDURE — 99285 EMERGENCY DEPT VISIT HI MDM: CPT | Mod: 25

## 2024-11-23 PROCEDURE — 93005 ELECTROCARDIOGRAM TRACING: CPT | Performed by: GENERAL PRACTICE

## 2024-11-23 RX ORDER — FAMOTIDINE 10 MG/ML
20 INJECTION INTRAVENOUS
Status: COMPLETED | OUTPATIENT
Start: 2024-11-23 | End: 2024-11-23

## 2024-11-23 RX ORDER — ONDANSETRON HYDROCHLORIDE 2 MG/ML
4 INJECTION, SOLUTION INTRAVENOUS
Status: COMPLETED | OUTPATIENT
Start: 2024-11-23 | End: 2024-11-23

## 2024-11-23 RX ADMIN — FAMOTIDINE 20 MG: 10 INJECTION, SOLUTION INTRAVENOUS at 09:11

## 2024-11-23 RX ADMIN — ONDANSETRON 4 MG: 2 INJECTION INTRAMUSCULAR; INTRAVENOUS at 09:11

## 2024-11-23 RX ADMIN — IOHEXOL 100 ML: 350 INJECTION, SOLUTION INTRAVENOUS at 11:11

## 2024-11-24 VITALS
BODY MASS INDEX: 28.34 KG/M2 | TEMPERATURE: 98 F | RESPIRATION RATE: 18 BRPM | DIASTOLIC BLOOD PRESSURE: 91 MMHG | SYSTOLIC BLOOD PRESSURE: 168 MMHG | OXYGEN SATURATION: 100 % | HEART RATE: 90 BPM | WEIGHT: 154 LBS | HEIGHT: 62 IN

## 2024-11-24 RX ORDER — ONDANSETRON 4 MG/1
4 TABLET, FILM COATED ORAL EVERY 6 HOURS
Qty: 6 TABLET | Refills: 0 | Status: SHIPPED | OUTPATIENT
Start: 2024-11-24

## 2024-11-24 RX ORDER — AMOXICILLIN AND CLAVULANATE POTASSIUM 875; 125 MG/1; MG/1
1 TABLET, FILM COATED ORAL EVERY 12 HOURS
Qty: 10 TABLET | Refills: 0 | Status: SHIPPED | OUTPATIENT
Start: 2024-11-24 | End: 2024-11-29

## 2024-11-24 NOTE — ED PROVIDER NOTES
Encounter Date: 11/23/2024       History     Chief Complaint   Patient presents with    Abdominal Pain    Diarrhea    Nausea    Vomiting     Pt says for the last few days she has been having symptoms     HPI  89-year-old woman with no reported past medical history is presents for evaluation of lower abdominal pain intermittently for the last at least 2 weeks she reports having 2-3 episodes of nonbloody diarrhea over the same time daily.  Reports nonbilious nonbloody vomiting yesterday.  Denies fever chills chest pain shortness of breath change in urination.  Reports a history of abdominal surgery but is not sure what it was.  Does not take any medicines regularly  Review of patient's allergies indicates:   Allergen Reactions    Lisinopril Other (See Comments)     angioedema     Past Medical History:   Diagnosis Date    Allergies     Essential (primary) hypertension      Past Surgical History:   Procedure Laterality Date    FOOT SURGERY       Family History   Problem Relation Name Age of Onset    No Known Problems Mother      Stroke Maternal Aunt      Stroke Paternal Cousin Cousin     Diabetes Mellitus Other      Cancer Neg Hx      Diabetes Neg Hx      Heart disease Neg Hx      Hypertension Neg Hx       Social History     Tobacco Use    Smoking status: Never    Smokeless tobacco: Never   Substance Use Topics    Alcohol use: Never    Drug use: Never     Review of Systems   All other systems reviewed and are negative.      Physical Exam     Initial Vitals [11/23/24 2010]   BP Pulse Resp Temp SpO2   (!) 168/91 91 18 98.2 °F (36.8 °C) 99 %      MAP       --         Physical Exam    Nursing note and vitals reviewed.  Constitutional: She appears well-developed. She is not diaphoretic.   HENT:   Head: Normocephalic and atraumatic.   Eyes: Right eye exhibits no discharge. Left eye exhibits no discharge.   Neck: No tracheal deviation present.   Cardiovascular:  Normal rate and regular rhythm.           Pulmonary/Chest: Breath  sounds normal. No stridor. No respiratory distress.   Abdominal: Abdomen is soft. There is abdominal tenderness.   Suprapubic tenderness to palpation without rebound or guarding    Indurated area superior to her umbilicus without redness or fluctuance or ttp There is no rebound.   Musculoskeletal:         General: No tenderness.     Neurological: She is alert and oriented to person, place, and time.   Skin: Skin is warm and dry.         ED Course   Procedures  Labs Reviewed   CBC W/ AUTO DIFFERENTIAL - Abnormal       Result Value    WBC 4.27      RBC 4.88      Hemoglobin 12.5      Hematocrit 39.0      MCV 80 (*)     MCH 25.6 (*)     MCHC 32.1      RDW 14.1      Platelets 461 (*)     MPV 9.6      Immature Granulocytes 0.5      Gran # (ANC) 2.7      Immature Grans (Abs) 0.02      Lymph # 1.0      Mono # 0.6      Eos # 0.0      Baso # 0.01      nRBC 0      Gran % 62.3      Lymph % 22.5      Mono % 13.8      Eosinophil % 0.7      Basophil % 0.2      Differential Method Automated     COMPREHENSIVE METABOLIC PANEL - Abnormal    Sodium 139      Potassium 3.1 (*)     Chloride 102      CO2 28      Glucose 108      BUN 16      Creatinine 1.2      Calcium 9.3      Total Protein 7.3      Albumin 3.6      Total Bilirubin 0.6      Alkaline Phosphatase 83      AST 23      ALT 9 (*)     eGFR 43.3 (*)     Anion Gap 9     URINALYSIS, REFLEX TO URINE CULTURE - Abnormal    Specimen UA Urine, Clean Catch      Color, UA Yellow      Appearance, UA Clear      pH, UA 6.0      Specific Gravity, UA 1.015      Protein, UA Negative      Glucose, UA Negative      Ketones, UA Negative      Bilirubin (UA) Negative      Occult Blood UA 1+ (*)     Nitrite, UA Negative      Urobilinogen, UA Negative      Leukocytes, UA Negative      Narrative:     In and Out Cath as needed it patient unable to void  Specimen Source->Urine   URINALYSIS MICROSCOPIC - Abnormal    RBC, UA 30 (*)     WBC, UA 3      Bacteria Rare      Squam Epithel, UA 4      Microscopic  Comment SEE COMMENT      Narrative:     In and Out Cath as needed it patient unable to void  Specimen Source->Urine   LIPASE    Lipase 45            Imaging Results              CT Abdomen Pelvis With IV Contrast NO Oral Contrast (Final result)  Result time 11/24/24 00:04:27      Final result by Laurie Post MD (11/24/24 00:04:27)                   Impression:      Small fat containing umbilical hernia with associated inflammatory change concerning for incarceration    Mild colitis of the transverse colon may be infectious or reactive.      Electronically signed by: Laurie Post  Date:    11/24/2024  Time:    00:04               Narrative:    EXAMINATION:  CT ABDOMEN PELVIS WITH IV CONTRAST    CLINICAL HISTORY:  Abdominal abscess/infection suspected;    TECHNIQUE:  Low dose axial images, sagittal and coronal reformations were obtained from the lung bases to the pubic symphysis following the IV administration of 100 mL of Omnipaque 350 .  Oral contrast was not given.    COMPARISON:  None.    FINDINGS:  Heart: Normal in size. No pericardial effusion.    Lung Bases: Well aerated, without consolidation or pleural fluid.    Liver: Scattered cysts.    Gallbladder: Cholelithiasis.    Bile Ducts: No evidence of dilated ducts.    Pancreas: No mass or peripancreatic fat stranding.    Spleen: Unremarkable.    Adrenals: Unremarkable.    Kidneys/ Ureters: Unremarkable.    Bladder: No evidence of wall thickening.    Reproductive organs: Calcified fibroid    GI Tract/Mesentery: Small hiatal hernia.  Colonic diverticulosis, without diverticulitis.  Mild colitis of the transverse colon may be infectious or reactive.  Normal appendix.  No small bowel obstruction.    Peritoneal Space: No ascites. No free air.    Retroperitoneum: No significant adenopathy.    Abdominal wall: Small fat containing umbilical hernia with associated inflammatory change concerning for incarceration    Vasculature: No significant  atherosclerosis or aneurysm.    Bones: No acute fracture.                                       Medications   famotidine (PF) injection 20 mg (20 mg Intravenous Given 11/23/24 2114)   ondansetron injection 4 mg (4 mg Intravenous Given 11/23/24 2113)   iohexoL (OMNIPAQUE 350) injection 100 mL (100 mLs Intravenous Given 11/23/24 2312)     Medical Decision Making  89-year-old woman presents for evaluation of lower abdominal pain diarrhea nausea and vomiting she is hypertensive otherwise her vitals are stable on exam her abdomen is soft with mild suprapubic tenderness to palpation without rebound or guarding she has not area of induration above her umbilicus, she reports taking antibiotics for this from her primary care doctor.  She completed her course.  Differential includes gastritis pancreatitis enteritis colitis UTI kidney stone medication effect    CT with colitis.  She also has not incarcerated umbilical fat containing hernia.    I had a prolonged discussion with the patient and her son at bedside regarding her hernia.  We discussed the hernia we discussed it is incarcerated in nature, we discussed that it was fat containing and not bowel containing.  We discussed options to include admission for general surgery evaluation in the morning versus discharge with general surgery follow up.  As she is asymptomatic she would prefer to follow up with [facility] outpatient I think this is reasonable as her exam is very benign.  As she has been having symptoms for greater than a week I think it is reasonable to treat her with antibiotics for a colitis.  She is in agreement with this plan.  Refer to [facility].  Given strict return precautions and follow up instructions.  Mild increase in her creatinine and does not meet the level of CORAZON.  Discussed her blood in her urine with her.  No urinary symptoms to suggest UTI    Amount and/or Complexity of Data Reviewed  Independent Historian: caregiver     Details: Son reports she  completed her antibiotic course today  External Data Reviewed: notes.     Details: Outpatient PCP visits 06/21/2023 follow up from the hospital where she had an CORAZON  Labs: ordered. Decision-making details documented in ED Course.  Radiology: ordered and independent interpretation performed. Decision-making details documented in ED Course.  ECG/medicine tests: ordered and independent interpretation performed. Decision-making details documented in ED Course.     Details: EKG on my independent interpretation 80 beats per minute normal axis no STEMI    Risk  Prescription drug management.               ED Course as of 11/24/24 0242   Sun Nov 24, 2024   0136 She reports feeling better she is ready to go home, Return precautions/follow up instructions/treatment plan given, expressed agreement and understanding.    [IC]      ED Course User Index  [IC] Bobby Jefferson MD                           Clinical Impression:  Final diagnoses:  [R10.9] Abdominal pain  [K42.0] Irreducible umbilical hernia (Primary)  [K52.9] Colitis          ED Disposition Condition    Discharge Stable          ED Prescriptions       Medication Sig Dispense Start Date End Date Auth. Provider    amoxicillin-clavulanate 875-125mg (AUGMENTIN) 875-125 mg per tablet Take 1 tablet by mouth every 12 (twelve) hours. for 5 days 10 tablet 11/24/2024 11/29/2024 Bobby Jefferson MD    ondansetron (ZOFRAN) 4 MG tablet Take 1 tablet (4 mg total) by mouth every 6 (six) hours. 6 tablet 11/24/2024 -- Bobby Jefferson MD          Follow-up Information       Follow up With Specialties Details Why Contact Info Additional Information    [facility] - Emergency Dept Emergency Medicine Go to  As needed, If symptoms worsen [address]  [facility] [id number] [phone] 1st floor    Bertin Villalobos MD General Surgery Schedule an appointment as soon as possible for a visit on 12/2/2024 For follow-up [address]  [address]  [facility] [address]  [phone]                Bobby Jefferson MD  11/24/24 0242

## 2024-11-24 NOTE — DISCHARGE INSTRUCTIONS
You were seen for diarrhea.  Your CT scan showed colitis.  Take your Augmentin twice a day for 5 days.     You were also found to have an umbilical hernia.  Follow up with the general surgeon for this.    Please return to this facility or another ED as needed for any new or worsening symptoms including chest pain, shortness of breath, abdominal pain, nausea or vomiting, fever or chills. Please follow up with your primary care doctor in 3 days. Please take all medicines as prescribed.

## 2024-11-26 LAB
OHS QRS DURATION: 106 MS
OHS QTC CALCULATION: 463 MS